# Patient Record
Sex: MALE | Race: WHITE | NOT HISPANIC OR LATINO | Employment: FULL TIME | URBAN - NONMETROPOLITAN AREA
[De-identification: names, ages, dates, MRNs, and addresses within clinical notes are randomized per-mention and may not be internally consistent; named-entity substitution may affect disease eponyms.]

---

## 2023-11-15 ENCOUNTER — HOSPITAL ENCOUNTER (OUTPATIENT)
Facility: HOSPITAL | Age: 58
Setting detail: OBSERVATION
Discharge: HOME | End: 2023-11-20
Attending: FAMILY MEDICINE | Admitting: FAMILY MEDICINE
Payer: COMMERCIAL

## 2023-11-15 ENCOUNTER — APPOINTMENT (OUTPATIENT)
Dept: CT IMAGING | Facility: HOSPITAL | Age: 58
End: 2023-11-15
Payer: COMMERCIAL

## 2023-11-15 ENCOUNTER — NURSE TRIAGE (OUTPATIENT)
Dept: CALL CENTER | Facility: HOSPITAL | Age: 58
End: 2023-11-15

## 2023-11-15 VITALS — WEIGHT: 210 LBS

## 2023-11-15 DIAGNOSIS — G44.1 VASCULAR HEADACHE: ICD-10-CM

## 2023-11-15 DIAGNOSIS — I16.0 HYPERTENSIVE URGENCY, MALIGNANT: Primary | ICD-10-CM

## 2023-11-15 DIAGNOSIS — E87.6 HYPOKALEMIA: ICD-10-CM

## 2023-11-15 LAB
ALBUMIN SERPL-MCNC: 4.3 G/DL (ref 3.5–5.2)
ALBUMIN/GLOB SERPL: 1.5 G/DL
ALP SERPL-CCNC: 69 U/L (ref 39–117)
ALT SERPL W P-5'-P-CCNC: 21 U/L (ref 1–41)
ANION GAP SERPL CALCULATED.3IONS-SCNC: 11 MMOL/L (ref 5–15)
AST SERPL-CCNC: 21 U/L (ref 1–40)
BASOPHILS # BLD AUTO: 0.04 10*3/MM3 (ref 0–0.2)
BASOPHILS NFR BLD AUTO: 0.4 % (ref 0–1.5)
BILIRUB SERPL-MCNC: 0.6 MG/DL (ref 0–1.2)
BUN SERPL-MCNC: 19 MG/DL (ref 8–23)
BUN/CREAT SERPL: 20.7 (ref 7–25)
CALCIUM SPEC-SCNC: 9.8 MG/DL (ref 8.6–10.5)
CHLORIDE SERPL-SCNC: 103 MMOL/L (ref 98–107)
CK SERPL-CCNC: 77 U/L (ref 20–200)
CO2 SERPL-SCNC: 27 MMOL/L (ref 22–29)
CREAT SERPL-MCNC: 0.92 MG/DL (ref 0.76–1.27)
DEPRECATED RDW RBC AUTO: 41.1 FL (ref 37–54)
EGFRCR SERPLBLD CKD-EPI 2021: 95.2 ML/MIN/1.73
EOSINOPHIL # BLD AUTO: 0.11 10*3/MM3 (ref 0–0.4)
EOSINOPHIL NFR BLD AUTO: 1.2 % (ref 0.3–6.2)
ERYTHROCYTE [DISTWIDTH] IN BLOOD BY AUTOMATED COUNT: 11.8 % (ref 12.3–15.4)
GLOBULIN UR ELPH-MCNC: 2.9 GM/DL
GLUCOSE SERPL-MCNC: 102 MG/DL (ref 65–99)
HCT VFR BLD AUTO: 44.7 % (ref 37.5–51)
HGB BLD-MCNC: 14.7 G/DL (ref 13–17.7)
HOLD SPECIMEN: NORMAL
HOLD SPECIMEN: NORMAL
IMM GRANULOCYTES # BLD AUTO: 0.03 10*3/MM3 (ref 0–0.05)
IMM GRANULOCYTES NFR BLD AUTO: 0.3 % (ref 0–0.5)
LIPASE SERPL-CCNC: 51 U/L (ref 13–60)
LYMPHOCYTES # BLD AUTO: 2.04 10*3/MM3 (ref 0.7–3.1)
LYMPHOCYTES NFR BLD AUTO: 21.5 % (ref 19.6–45.3)
MCH RBC QN AUTO: 31.3 PG (ref 26.6–33)
MCHC RBC AUTO-ENTMCNC: 32.9 G/DL (ref 31.5–35.7)
MCV RBC AUTO: 95.1 FL (ref 79–97)
MONOCYTES # BLD AUTO: 0.79 10*3/MM3 (ref 0.1–0.9)
MONOCYTES NFR BLD AUTO: 8.3 % (ref 5–12)
NEUTROPHILS NFR BLD AUTO: 6.5 10*3/MM3 (ref 1.7–7)
NEUTROPHILS NFR BLD AUTO: 68.3 % (ref 42.7–76)
NRBC BLD AUTO-RTO: 0 /100 WBC (ref 0–0.2)
PLATELET # BLD AUTO: 311 10*3/MM3 (ref 140–450)
PMV BLD AUTO: 11.1 FL (ref 6–12)
POTASSIUM SERPL-SCNC: 3.3 MMOL/L (ref 3.5–5.2)
PROT SERPL-MCNC: 7.2 G/DL (ref 6–8.5)
RBC # BLD AUTO: 4.7 10*6/MM3 (ref 4.14–5.8)
SODIUM SERPL-SCNC: 141 MMOL/L (ref 136–145)
TROPONIN T SERPL HS-MCNC: 14 NG/L
WBC NRBC COR # BLD: 9.51 10*3/MM3 (ref 3.4–10.8)
WHOLE BLOOD HOLD COAG: NORMAL
WHOLE BLOOD HOLD SPECIMEN: NORMAL

## 2023-11-15 PROCEDURE — G0378 HOSPITAL OBSERVATION PER HR: HCPCS

## 2023-11-15 PROCEDURE — 96375 TX/PRO/DX INJ NEW DRUG ADDON: CPT

## 2023-11-15 PROCEDURE — 80053 COMPREHEN METABOLIC PANEL: CPT | Performed by: FAMILY MEDICINE

## 2023-11-15 PROCEDURE — 93005 ELECTROCARDIOGRAM TRACING: CPT | Performed by: FAMILY MEDICINE

## 2023-11-15 PROCEDURE — 84484 ASSAY OF TROPONIN QUANT: CPT | Performed by: FAMILY MEDICINE

## 2023-11-15 PROCEDURE — 25510000001 IOPAMIDOL 61 % SOLUTION: Performed by: FAMILY MEDICINE

## 2023-11-15 PROCEDURE — 0 HYDROMORPHONE 1 MG/ML SOLUTION: Performed by: FAMILY MEDICINE

## 2023-11-15 PROCEDURE — 93010 ELECTROCARDIOGRAM REPORT: CPT | Performed by: INTERNAL MEDICINE

## 2023-11-15 PROCEDURE — 93005 ELECTROCARDIOGRAM TRACING: CPT

## 2023-11-15 PROCEDURE — 96365 THER/PROPH/DIAG IV INF INIT: CPT

## 2023-11-15 PROCEDURE — 25010000002 LABETALOL 5 MG/ML SOLUTION: Performed by: FAMILY MEDICINE

## 2023-11-15 PROCEDURE — 25010000002 MORPHINE SULFATE (PF) 2 MG/ML SOLUTION: Performed by: FAMILY MEDICINE

## 2023-11-15 PROCEDURE — 96366 THER/PROPH/DIAG IV INF ADDON: CPT

## 2023-11-15 PROCEDURE — 70450 CT HEAD/BRAIN W/O DYE: CPT

## 2023-11-15 PROCEDURE — 25010000002 HYDRALAZINE PER 20 MG: Performed by: FAMILY MEDICINE

## 2023-11-15 PROCEDURE — 25010000002 ONDANSETRON PER 1 MG: Performed by: FAMILY MEDICINE

## 2023-11-15 PROCEDURE — 82550 ASSAY OF CK (CPK): CPT | Performed by: FAMILY MEDICINE

## 2023-11-15 PROCEDURE — 25010000002 DIPHENHYDRAMINE PER 50 MG: Performed by: FAMILY MEDICINE

## 2023-11-15 PROCEDURE — 25810000003 SODIUM CHLORIDE 0.9 % SOLUTION: Performed by: FAMILY MEDICINE

## 2023-11-15 PROCEDURE — 25010000002 PROCHLORPERAZINE 10 MG/2ML SOLUTION: Performed by: FAMILY MEDICINE

## 2023-11-15 PROCEDURE — 85025 COMPLETE CBC W/AUTO DIFF WBC: CPT | Performed by: FAMILY MEDICINE

## 2023-11-15 PROCEDURE — 36415 COLL VENOUS BLD VENIPUNCTURE: CPT

## 2023-11-15 PROCEDURE — 96376 TX/PRO/DX INJ SAME DRUG ADON: CPT

## 2023-11-15 PROCEDURE — 74177 CT ABD & PELVIS W/CONTRAST: CPT

## 2023-11-15 PROCEDURE — 83690 ASSAY OF LIPASE: CPT | Performed by: FAMILY MEDICINE

## 2023-11-15 PROCEDURE — 99285 EMERGENCY DEPT VISIT HI MDM: CPT

## 2023-11-15 RX ORDER — CHLORHEXIDINE GLUCONATE 500 MG/1
1 CLOTH TOPICAL ONCE
Status: COMPLETED | OUTPATIENT
Start: 2023-11-16 | End: 2023-11-16

## 2023-11-15 RX ORDER — POTASSIUM CITRATE 10 MEQ/1
10 TABLET, EXTENDED RELEASE ORAL
COMMUNITY

## 2023-11-15 RX ORDER — OXYCODONE HYDROCHLORIDE 5 MG/1
5 TABLET ORAL EVERY 4 HOURS PRN
Status: DISCONTINUED | OUTPATIENT
Start: 2023-11-15 | End: 2023-11-20 | Stop reason: HOSPADM

## 2023-11-15 RX ORDER — TIZANIDINE 2 MG/1
2 TABLET ORAL NIGHTLY PRN
COMMUNITY

## 2023-11-15 RX ORDER — BISACODYL 5 MG/1
5 TABLET, DELAYED RELEASE ORAL DAILY PRN
Status: DISCONTINUED | OUTPATIENT
Start: 2023-11-15 | End: 2023-11-20 | Stop reason: HOSPADM

## 2023-11-15 RX ORDER — SODIUM CHLORIDE 0.9 % (FLUSH) 0.9 %
10 SYRINGE (ML) INJECTION AS NEEDED
Status: DISCONTINUED | OUTPATIENT
Start: 2023-11-15 | End: 2023-11-20 | Stop reason: HOSPADM

## 2023-11-15 RX ORDER — PROCHLORPERAZINE EDISYLATE 5 MG/ML
10 INJECTION INTRAMUSCULAR; INTRAVENOUS ONCE
Status: COMPLETED | OUTPATIENT
Start: 2023-11-15 | End: 2023-11-15

## 2023-11-15 RX ORDER — GABAPENTIN 300 MG/1
300 CAPSULE ORAL 3 TIMES DAILY
Status: DISCONTINUED | OUTPATIENT
Start: 2023-11-16 | End: 2023-11-16

## 2023-11-15 RX ORDER — HEPARIN SODIUM 5000 [USP'U]/ML
5000 INJECTION, SOLUTION INTRAVENOUS; SUBCUTANEOUS EVERY 8 HOURS SCHEDULED
Status: DISCONTINUED | OUTPATIENT
Start: 2023-11-15 | End: 2023-11-17

## 2023-11-15 RX ORDER — HYDRALAZINE HYDROCHLORIDE 20 MG/ML
10 INJECTION INTRAMUSCULAR; INTRAVENOUS ONCE
Status: COMPLETED | OUTPATIENT
Start: 2023-11-15 | End: 2023-11-15

## 2023-11-15 RX ORDER — HYDRALAZINE HYDROCHLORIDE 25 MG/1
25 TABLET, FILM COATED ORAL
Qty: 30 TABLET | Refills: 0 | Status: SHIPPED | OUTPATIENT
Start: 2023-11-15 | End: 2023-12-15

## 2023-11-15 RX ORDER — TRAZODONE HYDROCHLORIDE 50 MG/1
50 TABLET ORAL NIGHTLY
Status: DISCONTINUED | OUTPATIENT
Start: 2023-11-16 | End: 2023-11-20 | Stop reason: HOSPADM

## 2023-11-15 RX ORDER — TAMSULOSIN HYDROCHLORIDE 0.4 MG/1
0.4 CAPSULE ORAL DAILY
Status: DISCONTINUED | OUTPATIENT
Start: 2023-11-16 | End: 2023-11-20 | Stop reason: HOSPADM

## 2023-11-15 RX ORDER — TAMSULOSIN HYDROCHLORIDE 0.4 MG/1
1 CAPSULE ORAL DAILY
COMMUNITY

## 2023-11-15 RX ORDER — CLONIDINE HYDROCHLORIDE 0.1 MG/1
0.1 TABLET ORAL DAILY PRN
COMMUNITY
End: 2023-11-20 | Stop reason: HOSPADM

## 2023-11-15 RX ORDER — SODIUM CHLORIDE 0.9 % (FLUSH) 0.9 %
10 SYRINGE (ML) INJECTION EVERY 12 HOURS SCHEDULED
Status: DISCONTINUED | OUTPATIENT
Start: 2023-11-15 | End: 2023-11-20 | Stop reason: HOSPADM

## 2023-11-15 RX ORDER — METOPROLOL TARTRATE 100 MG/1
100 TABLET ORAL 2 TIMES DAILY
Status: DISCONTINUED | OUTPATIENT
Start: 2023-11-16 | End: 2023-11-17

## 2023-11-15 RX ORDER — CLONIDINE HYDROCHLORIDE 0.1 MG/1
0.1 TABLET ORAL 2 TIMES DAILY
Status: DISCONTINUED | OUTPATIENT
Start: 2023-11-16 | End: 2023-11-16

## 2023-11-15 RX ORDER — BUSPIRONE HYDROCHLORIDE 5 MG/1
5 TABLET ORAL 3 TIMES DAILY
Status: DISCONTINUED | OUTPATIENT
Start: 2023-11-16 | End: 2023-11-16

## 2023-11-15 RX ORDER — ONDANSETRON 2 MG/ML
4 INJECTION INTRAMUSCULAR; INTRAVENOUS EVERY 6 HOURS PRN
Status: DISCONTINUED | OUTPATIENT
Start: 2023-11-15 | End: 2023-11-20 | Stop reason: HOSPADM

## 2023-11-15 RX ORDER — ONDANSETRON 2 MG/ML
4 INJECTION INTRAMUSCULAR; INTRAVENOUS ONCE
Status: COMPLETED | OUTPATIENT
Start: 2023-11-15 | End: 2023-11-15

## 2023-11-15 RX ORDER — POLYETHYLENE GLYCOL 3350 17 G/17G
17 POWDER, FOR SOLUTION ORAL DAILY PRN
Status: DISCONTINUED | OUTPATIENT
Start: 2023-11-15 | End: 2023-11-20 | Stop reason: HOSPADM

## 2023-11-15 RX ORDER — HYDRALAZINE HYDROCHLORIDE 20 MG/ML
20 INJECTION INTRAMUSCULAR; INTRAVENOUS ONCE
Status: COMPLETED | OUTPATIENT
Start: 2023-11-15 | End: 2023-11-15

## 2023-11-15 RX ORDER — GABAPENTIN 300 MG/1
300 CAPSULE ORAL 2 TIMES DAILY
COMMUNITY

## 2023-11-15 RX ORDER — SODIUM CHLORIDE 9 MG/ML
40 INJECTION, SOLUTION INTRAVENOUS AS NEEDED
Status: DISCONTINUED | OUTPATIENT
Start: 2023-11-15 | End: 2023-11-20 | Stop reason: HOSPADM

## 2023-11-15 RX ORDER — LABETALOL HYDROCHLORIDE 5 MG/ML
10 INJECTION, SOLUTION INTRAVENOUS ONCE
Status: COMPLETED | OUTPATIENT
Start: 2023-11-15 | End: 2023-11-15

## 2023-11-15 RX ORDER — DIPHENHYDRAMINE HYDROCHLORIDE 50 MG/ML
25 INJECTION INTRAMUSCULAR; INTRAVENOUS ONCE
Status: COMPLETED | OUTPATIENT
Start: 2023-11-15 | End: 2023-11-15

## 2023-11-15 RX ORDER — MORPHINE SULFATE 2 MG/ML
2 INJECTION, SOLUTION INTRAMUSCULAR; INTRAVENOUS ONCE
Status: COMPLETED | OUTPATIENT
Start: 2023-11-15 | End: 2023-11-15

## 2023-11-15 RX ORDER — METOPROLOL TARTRATE 100 MG/1
150 TABLET ORAL ONCE
Status: ON HOLD | COMMUNITY
End: 2023-11-16

## 2023-11-15 RX ORDER — AMOXICILLIN 250 MG
2 CAPSULE ORAL 2 TIMES DAILY
Status: DISCONTINUED | OUTPATIENT
Start: 2023-11-15 | End: 2023-11-20 | Stop reason: HOSPADM

## 2023-11-15 RX ORDER — CHLORHEXIDINE GLUCONATE 500 MG/1
1 CLOTH TOPICAL EVERY 24 HOURS
Status: DISCONTINUED | OUTPATIENT
Start: 2023-11-17 | End: 2023-11-16 | Stop reason: HOSPADM

## 2023-11-15 RX ORDER — BUSPIRONE HYDROCHLORIDE 5 MG/1
10 TABLET ORAL 2 TIMES DAILY PRN
COMMUNITY

## 2023-11-15 RX ORDER — NITROGLYCERIN 0.4 MG/1
0.4 TABLET SUBLINGUAL
Status: DISCONTINUED | OUTPATIENT
Start: 2023-11-15 | End: 2023-11-20 | Stop reason: HOSPADM

## 2023-11-15 RX ORDER — TIZANIDINE 4 MG/1
4 TABLET ORAL NIGHTLY PRN
Status: DISCONTINUED | OUTPATIENT
Start: 2023-11-15 | End: 2023-11-20 | Stop reason: HOSPADM

## 2023-11-15 RX ORDER — ACETAMINOPHEN 325 MG/1
650 TABLET ORAL EVERY 6 HOURS PRN
Status: DISCONTINUED | OUTPATIENT
Start: 2023-11-15 | End: 2023-11-20 | Stop reason: HOSPADM

## 2023-11-15 RX ORDER — FAMOTIDINE 10 MG/ML
20 INJECTION, SOLUTION INTRAVENOUS ONCE
Status: COMPLETED | OUTPATIENT
Start: 2023-11-15 | End: 2023-11-15

## 2023-11-15 RX ORDER — BISACODYL 10 MG
10 SUPPOSITORY, RECTAL RECTAL DAILY PRN
Status: DISCONTINUED | OUTPATIENT
Start: 2023-11-15 | End: 2023-11-20 | Stop reason: HOSPADM

## 2023-11-15 RX ORDER — TRAZODONE HYDROCHLORIDE 50 MG/1
50 TABLET ORAL NIGHTLY
COMMUNITY

## 2023-11-15 RX ADMIN — IOPAMIDOL 100 ML: 612 INJECTION, SOLUTION INTRAVENOUS at 16:20

## 2023-11-15 RX ADMIN — DIPHENHYDRAMINE HYDROCHLORIDE 25 MG: 50 INJECTION, SOLUTION INTRAMUSCULAR; INTRAVENOUS at 16:03

## 2023-11-15 RX ADMIN — MORPHINE SULFATE 2 MG: 2 INJECTION, SOLUTION INTRAMUSCULAR; INTRAVENOUS at 16:03

## 2023-11-15 RX ADMIN — PROCHLORPERAZINE EDISYLATE 10 MG: 5 INJECTION INTRAMUSCULAR; INTRAVENOUS at 16:03

## 2023-11-15 RX ADMIN — SODIUM CHLORIDE 10 MG/HR: 9 INJECTION, SOLUTION INTRAVENOUS at 23:11

## 2023-11-15 RX ADMIN — SODIUM CHLORIDE 5 MG/HR: 9 INJECTION, SOLUTION INTRAVENOUS at 20:32

## 2023-11-15 RX ADMIN — FAMOTIDINE 20 MG: 10 INJECTION INTRAVENOUS at 16:04

## 2023-11-15 RX ADMIN — LABETALOL HYDROCHLORIDE 10 MG: 5 INJECTION INTRAVENOUS at 16:10

## 2023-11-15 RX ADMIN — SODIUM CHLORIDE 10 MG/HR: 9 INJECTION, SOLUTION INTRAVENOUS at 21:06

## 2023-11-15 RX ADMIN — ACETAMINOPHEN 650 MG: 325 TABLET, FILM COATED ORAL at 23:11

## 2023-11-15 RX ADMIN — ONDANSETRON 4 MG: 2 INJECTION INTRAMUSCULAR; INTRAVENOUS at 22:28

## 2023-11-15 RX ADMIN — HYDRALAZINE HYDROCHLORIDE 20 MG: 20 INJECTION INTRAMUSCULAR; INTRAVENOUS at 20:01

## 2023-11-15 RX ADMIN — HYDRALAZINE HYDROCHLORIDE 10 MG: 20 INJECTION INTRAMUSCULAR; INTRAVENOUS at 19:09

## 2023-11-15 RX ADMIN — HYDROMORPHONE HYDROCHLORIDE 1 MG: 1 INJECTION, SOLUTION INTRAMUSCULAR; INTRAVENOUS; SUBCUTANEOUS at 21:03

## 2023-11-15 NOTE — TELEPHONE ENCOUNTER
Pt. Wife calling says he has been having bp issues and has had meds adjusted, but today BP still high, pt. Has had headache several days and sob with chest tightness at times, no blurred vision, she and he states, bp is high now 192/119. Chest tightness intermittent, not at this time, he states,  told her to take him to nearest ER not to drive hour to [place], or she can call EMS, she did verbalize understanding, but not sure if will follow directions. Wants to come to  [place]. Told her can be transferred if goes to other hospital, if needs transfer.

## 2023-11-15 NOTE — Clinical Note
Level of Care: Telemetry [5]   Diagnosis: Hypertensive urgency [291457]   Admitting Physician: ROCCO COLLINS [[id number]]   Attending Physician: ROCCO COLLINS [[id number]]

## 2023-11-15 NOTE — ED PROVIDER NOTES
HPI:  Patient is a 58-year-old white male who presents to the emergency room with a complaint of having elevated blood pressure has been present for the past few weeks.  Patient also with a headache that is developed over the past 2 to 3 days.  Blood pressures have been systolically greater than 176.  Today upon arrival to the emergency room is over 200.  Patient takes 150 mg of metoprolol extended release daily and clonidine as needed for blood pressure.  He states that the clonidine does not seem to really help and is bringing the blood pressure on down.  Patient has not ran out of any medication and has not missed any doses of medications.  Patient also states during this time the last 2 weeks he has been having intermittent abdominal pain in the mid to the right lower quadrant of the abdomen.  He states that the pressure and the pain in the abdomen seems to be worse when the pressure is a lot higher for blood pressure.  No nausea vomiting or diarrhea no chest pain or diaphoresis.  No visual changes.  Patient currently rates the headache 4 out of 10 and abdominal pain 6 out of 10    REVIEW OF SYSTEMS  CONSTITUTIONAL:  No complaints of fever, chills,or weakness  EYES:  No complaints of discharge   ENT: No complaints of sore throat or ear pain  CARDIOVASCULAR: Positive for hypertension RESPIRATORY:  No complaints of cough or shortness of breath  GI: Positive for mid abdominal to right lower quadrant abdominal pain sharp and stabbing in nature   MUSCULOSKELETAL:  No complaints of back pain  SKIN:  No complaints of rash  NEUROLOGIC: Positive for headache 4 out of 10 throbbing and pounding in nature  ENDOCRINE:  No complaints of polyuria or polydipsia  LYMPHATIC:  No complaints of swollen glands  GENITOURINARY: No complaints of urinary frequency or hematuria        PAST MEDICAL HISTORY  Past Medical History:   Diagnosis Date    Hypertension     Prostate disease     Sleep apnea        FAMILY HISTORY  History reviewed.  "No pertinent family history.    SOCIAL HISTORY  Social History     Socioeconomic History    Marital status:        IMMUNIZATION HISTORY  Deferred to primary care physician.    SURGICAL HISTORY  Past Surgical History:   Procedure Laterality Date    BACK SURGERY      CHOLECYSTECTOMY         CURRENT MEDICATIONS  No current facility-administered medications for this encounter.    Current Outpatient Medications:     busPIRone (BUSPAR) 5 MG tablet, Take 1 tablet by mouth 3 (Three) Times a Day., Disp: , Rfl:     cloNIDine (CATAPRES) 0.1 MG tablet, Take 1 tablet by mouth 2 (Two) Times a Day., Disp: , Rfl:     gabapentin (NEURONTIN) 300 MG capsule, Take 1 capsule by mouth 3 (Three) Times a Day., Disp: , Rfl:     hydrALAZINE (APRESOLINE) 25 MG tablet, Take 1 tablet by mouth Daily With Breakfast for 30 days., Disp: 30 tablet, Rfl: 0    metoprolol tartrate (LOPRESSOR) 100 MG tablet, Take 1.5 tablets by mouth 2 (Two) Times a Day., Disp: , Rfl:     potassium citrate (UROCIT-K) 10 MEQ (1080 MG) CR tablet, Take 1 tablet by mouth 3 (Three) Times a Day With Meals., Disp: , Rfl:     tamsulosin (FLOMAX) 0.4 MG capsule 24 hr capsule, Take 1 capsule by mouth Daily., Disp: , Rfl:     tiZANidine (ZANAFLEX) 4 MG tablet, Take 1 tablet by mouth At Night As Needed for Muscle Spasms., Disp: , Rfl:     traZODone (DESYREL) 50 MG tablet, Take 1 tablet by mouth Every Night., Disp: , Rfl:     ALLERGIES  Allergies   Allergen Reactions    Codeine Nausea And Vomiting       Neuro exam    VITAL SIGNS:   /99   Pulse 73   Temp 97.5 °F (36.4 °C)   Resp 20   Ht 182.9 cm (72\")   Wt 105 kg (230 lb 8 oz)   SpO2 91%   BMI 31.26 kg/m²     Constitutional: Patient is alert and in no distress.  Patient with moderate head moderate to severe abdominal discomfort.    ENT: There is a normal pharynx with no acute erythema or exudate and oral mucosa is moist.  Nose is clear with no drainage.  Tympanic membranes intact and " non-erythemic    Cardiovascular: S1-S2 regular rate and rhythm no murmurs rubs or gallops is noted.    Respiratory: Patient is clear to auscultation bilaterally with no wheezing or rhonchi.  Chest wall is nontender.  There is no external lesions on the chest.  There is no crepitance    Abdomen: Tenderness to palpation in periumbilical and right lower quadrant of the abdomen.  There is no abdominal distention or fluid wave.  Bowel sounds are normal in all 4 quadrants.    Integumentary: No acute changes noted    Genitourinary: Patient is voiding appropriately.    Integument: No acute lesions noted color appears to be normal.    Neurological: Patient with normal gait.CNS 2-12 grossly intact there is no focal deficits strength is 5+ in bilateral upper and lower extremities.    Jennifer Coma Scale: 15          RADIOLOGY/PROCEDURES      CT Head Without Contrast   Final Result   Impression:         No acute intracranial abnormality.       This report was signed and finalized on 11/15/2023 4:32 PM CST by Erik Perera.          CT Abdomen Pelvis With Contrast   Final Result   1. Small bilateral pleural effusions with at least passive atelectasis   in both lower lobes. Cardiomegaly.   2. Scattered low-density liver lesions are probably cysts but are not   fully characterized on this single contrast study. The largest is in the   right hepatic lobe measuring 2.2 cm.   3. Splenomegaly.   4. Enlarged prostate.   5. Gastric wall thickening likely due to underdistention. Normal   appendix. No small bowel obstruction is seen. Under distention of most   of the colon.   6. Small fat-containing left inguinal and umbilical hernias. Other   nonacute findings, as discussed.           The full report of this exam was immediately signed and available to the   emergency room. The patient is currently in the emergency room.       This report was signed and finalized on 11/15/2023 4:35 PM CST by Dr. Pancho Hobson MD.                   FUTURE APPOINTMENTS     No future appointments.               COURSE & MEDICAL DECISION MAKING     Patient's partial differential diagnosis can include: Hypertensive urgency, hypertensive headache, atypical migraine, brain tumor and other     Discussed with the patient and family member the results of all laboratory radiological test.  All questions were answered    Due to patient's resistant hypertension despite using labetalol as well as hydralazine IV for blood pressure control patient's blood pressure was not controlled.  Due to this a Cardene drip was started for blood pressure control.  Due to this also the patient will need to be admitted to the hospital.  Discussed this with the patient and the patient felt comfortable with this plan.    Discussed the case with hospitalist Dr. Mai who agrees to admit to her medical service.      Patient's level of risk: Moderate        CRITICAL CARE    CRITICAL CARE: No CRITICAL CARE TIME: None      The patient's last clinical visit to PCP in the [facility] electronic old medical record was reviewed by me:     Also Old charts were reviewed per [facility] EMR.  Pertinent details are summarized above.  All laboratory, radiologic, and EKG studies that were performed in the Emergency Department were a necessary part of the evaluation needed to exclude unstable or  emergent medical conditions:     Patient was hemodynamically and neurologically stable in the ED.   Pertinent studies were reviewed as above.     Recent Results (from the past 24 hour(s))   ECG 12 Lead Chest Pain    Collection Time: 11/15/23  2:58 PM   Result Value Ref Range    QT Interval 392 ms    QTC Interval 449 ms   Comprehensive Metabolic Panel    Collection Time: 11/15/23  3:02 PM    Specimen: Blood   Result Value Ref Range    Glucose 102 (H) 65 - 99 mg/dL    BUN 19 8 - 23 mg/dL    Creatinine 0.92 0.76 - 1.27 mg/dL    Sodium 141 136 - 145 mmol/L    Potassium 3.3 (L) 3.5 - 5.2 mmol/L    Chloride 103 98 - 107 mmol/L     CO2 27.0 22.0 - 29.0 mmol/L    Calcium 9.8 8.6 - 10.5 mg/dL    Total Protein 7.2 6.0 - 8.5 g/dL    Albumin 4.3 3.5 - 5.2 g/dL    ALT (SGPT) 21 1 - 41 U/L    AST (SGOT) 21 1 - 40 U/L    Alkaline Phosphatase 69 39 - 117 U/L    Total Bilirubin 0.6 0.0 - 1.2 mg/dL    Globulin 2.9 gm/dL    A/G Ratio 1.5 g/dL    BUN/Creatinine Ratio 20.7 7.0 - 25.0    Anion Gap 11.0 5.0 - 15.0 mmol/L    eGFR 95.2 >60.0 mL/min/1.73   Single High Sensitivity Troponin T    Collection Time: 11/15/23  3:02 PM    Specimen: Blood   Result Value Ref Range    HS Troponin T 14 <22 ng/L   Green Top (Gel)    Collection Time: 11/15/23  3:02 PM   Result Value Ref Range    Extra Tube Hold for add-ons.    Lavender Top    Collection Time: 11/15/23  3:02 PM   Result Value Ref Range    Extra Tube hold for add-on    Red Top    Collection Time: 11/15/23  3:02 PM   Result Value Ref Range    Extra Tube Hold for add-ons.    Light Blue Top    Collection Time: 11/15/23  3:02 PM   Result Value Ref Range    Extra Tube Hold for add-ons.    CBC Auto Differential    Collection Time: 11/15/23  3:02 PM    Specimen: Blood   Result Value Ref Range    WBC 9.51 3.40 - 10.80 10*3/mm3    RBC 4.70 4.14 - 5.80 10*6/mm3    Hemoglobin 14.7 13.0 - 17.7 g/dL    Hematocrit 44.7 37.5 - 51.0 %    MCV 95.1 79.0 - 97.0 fL    MCH 31.3 26.6 - 33.0 pg    MCHC 32.9 31.5 - 35.7 g/dL    RDW 11.8 (L) 12.3 - 15.4 %    RDW-SD 41.1 37.0 - 54.0 fl    MPV 11.1 6.0 - 12.0 fL    Platelets 311 140 - 450 10*3/mm3    Neutrophil % 68.3 42.7 - 76.0 %    Lymphocyte % 21.5 19.6 - 45.3 %    Monocyte % 8.3 5.0 - 12.0 %    Eosinophil % 1.2 0.3 - 6.2 %    Basophil % 0.4 0.0 - 1.5 %    Immature Grans % 0.3 0.0 - 0.5 %    Neutrophils, Absolute 6.50 1.70 - 7.00 10*3/mm3    Lymphocytes, Absolute 2.04 0.70 - 3.10 10*3/mm3    Monocytes, Absolute 0.79 0.10 - 0.90 10*3/mm3    Eosinophils, Absolute 0.11 0.00 - 0.40 10*3/mm3    Basophils, Absolute 0.04 0.00 - 0.20 10*3/mm3    Immature Grans, Absolute 0.03 0.00 - 0.05  10*3/mm3    nRBC 0.0 0.0 - 0.2 /100 WBC   CK    Collection Time: 11/15/23  3:02 PM    Specimen: Blood   Result Value Ref Range    Creatine Kinase 77 20 - 200 U/L   Lipase    Collection Time: 11/15/23  3:02 PM    Specimen: Blood   Result Value Ref Range    Lipase 51 13 - 60 U/L         The patient received:  Medications   labetalol (NORMODYNE,TRANDATE) injection 10 mg (10 mg Intravenous Given 11/15/23 1610)   famotidine (PEPCID) injection 20 mg (20 mg Intravenous Given 11/15/23 1604)   prochlorperazine (COMPAZINE) injection 10 mg (10 mg Intravenous Given 11/15/23 1603)   diphenhydrAMINE (BENADRYL) injection 25 mg (25 mg Intravenous Given 11/15/23 1603)   Morphine sulfate (PF) injection 2 mg (2 mg Intravenous Given 11/15/23 1603)   iopamidol (ISOVUE-300) 61 % injection 100 mL (100 mL Intravenous Given 11/15/23 1620)   hydrALAZINE (APRESOLINE) injection 10 mg (10 mg Intravenous Given 11/15/23 1909)              ED Disposition       ED Disposition   Discharge    Condition   Stable    Comment   --                   Dragon disclaimer:  Part of this note may be an electronic transcription/translation of spoken language to printed text using the Dragon Dictation System.     This information is consistent with my knowledge of the patient’s controlled substance use history.    Patient evaluate during Coronavirus Pandemic. Isolation practices followed according to [facility] policy.    FINAL IMPRESSION   Diagnosis Plan   1. Hypertensive urgency, malignant        2. Vascular headache        3. Hypokalemia              MD He Grubbs Jr, Thomas Mark Jr., MD  11/16/23 9721

## 2023-11-15 NOTE — TELEPHONE ENCOUNTER
"Reason for Disposition   [1] Systolic BP  >= 160 OR Diastolic >= 100 AND [2] cardiac (e.g., breathing difficulty, chest pain) or neurologic symptoms (e.g., new-onset blurred or double vision, unsteady gait)    Additional Information   Negative: Difficult to awaken or acting confused (e.g., disoriented, slurred speech)   Negative: SEVERE difficulty breathing (e.g., struggling for each breath, speaks in single words)   Negative: [1] Weakness of the face, arm or leg on one side of the body AND [2] new-onset   Negative: [1] Numbness (i.e., loss of sensation) of the face, arm or leg on one side of the body AND [2] new-onset   Negative: [1] Chest pain lasts > 5 minutes AND [2] history of heart disease (i.e., heart attack, bypass surgery, angina, angioplasty, CHF)   Negative: [1] Chest pain AND [2] took nitrogylcerin AND [3] pain was not relieved   Negative: Sounds like a life-threatening emergency to the triager   Negative: Symptom is main concern (e.g., headache, chest pain)   Negative: Low blood pressure is main concern    Answer Assessment - Initial Assessment Questions  1. BLOOD PRESSURE: \"What is the blood pressure?\" \"Did you take at least two measurements 5 minutes apart?\"      199/122, HR 68, 192/119, HR 64  2. ONSET: \"When did you take your blood pressure?\"      Just now  3. HOW: \"How did you take your blood pressure?\" (e.g., automatic home BP monitor, visiting nurse)      Automatic cuff  4. HISTORY: \"Do you have a history of high blood pressure?\"      yes  5. MEDICINES: \"Are you taking any medicines for blood pressure?\" \"Have you missed any doses recently?\"      Yes takes not missed  6. OTHER SYMPTOMS: \"Do you have any symptoms?\" (e.g., blurred vision, chest pain, difficulty breathing, headache, weakness)     Headache,sob at times, tightness in chest  7. PREGNANCY: \"Is there any chance you are pregnant?\" \"When was your last menstrual period?\"      no    Protocols used: Blood Pressure - High-ADULT-AH    "

## 2023-11-16 ENCOUNTER — APPOINTMENT (OUTPATIENT)
Dept: CARDIOLOGY | Facility: HOSPITAL | Age: 58
End: 2023-11-16
Payer: COMMERCIAL

## 2023-11-16 LAB
ANION GAP SERPL CALCULATED.3IONS-SCNC: 10 MMOL/L (ref 5–15)
BH CV ECHO MEAS - AO MAX PG: 8.5 MMHG
BH CV ECHO MEAS - AO MEAN PG: 4.5 MMHG
BH CV ECHO MEAS - AO ROOT DIAM: 3.3 CM
BH CV ECHO MEAS - AO V2 MAX: 146 CM/SEC
BH CV ECHO MEAS - AO V2 VTI: 29.3 CM
BH CV ECHO MEAS - AVA(I,D): 1.8 CM2
BH CV ECHO MEAS - EDV(CUBED): 132.7 ML
BH CV ECHO MEAS - EDV(MOD-SP4): 117 ML
BH CV ECHO MEAS - EF(MOD-SP4): 64.1 %
BH CV ECHO MEAS - ESV(CUBED): 81.7 ML
BH CV ECHO MEAS - ESV(MOD-SP4): 42 ML
BH CV ECHO MEAS - FS: 14.9 %
BH CV ECHO MEAS - IVS/LVPW: 1.13 CM
BH CV ECHO MEAS - IVSD: 1.13 CM
BH CV ECHO MEAS - LA DIMENSION: 4.8 CM
BH CV ECHO MEAS - LAT PEAK E' VEL: 6.8 CM/SEC
BH CV ECHO MEAS - LV DIASTOLIC VOL/BSA (35-75): 51.9 CM2
BH CV ECHO MEAS - LV MASS(C)D: 204.7 GRAMS
BH CV ECHO MEAS - LV MAX PG: 3.3 MMHG
BH CV ECHO MEAS - LV MEAN PG: 2 MMHG
BH CV ECHO MEAS - LV SYSTOLIC VOL/BSA (12-30): 18.6 CM2
BH CV ECHO MEAS - LV V1 MAX: 90.8 CM/SEC
BH CV ECHO MEAS - LV V1 VTI: 18.6 CM
BH CV ECHO MEAS - LVIDD: 5.1 CM
BH CV ECHO MEAS - LVIDS: 4.3 CM
BH CV ECHO MEAS - LVOT AREA: 2.8 CM2
BH CV ECHO MEAS - LVOT DIAM: 1.9 CM
BH CV ECHO MEAS - LVPWD: 1 CM
BH CV ECHO MEAS - MED PEAK E' VEL: 6.6 CM/SEC
BH CV ECHO MEAS - MR MAX PG: 48.8 MMHG
BH CV ECHO MEAS - MR MAX VEL: 349 CM/SEC
BH CV ECHO MEAS - MV A MAX VEL: 39.4 CM/SEC
BH CV ECHO MEAS - MV DEC TIME: 0.2 SEC
BH CV ECHO MEAS - MV E MAX VEL: 95.1 CM/SEC
BH CV ECHO MEAS - MV E/A: 2.41
BH CV ECHO MEAS - SI(MOD-SP4): 33.3 ML/M2
BH CV ECHO MEAS - SV(LVOT): 52.7 ML
BH CV ECHO MEAS - SV(MOD-SP4): 75 ML
BH CV ECHO MEAS - TR MAX PG: 9.2 MMHG
BH CV ECHO MEAS - TR MAX VEL: 152 CM/SEC
BH CV ECHO MEASUREMENTS AVERAGE E/E' RATIO: 14.19
BUN SERPL-MCNC: 16 MG/DL (ref 8–23)
BUN/CREAT SERPL: 17.2 (ref 7–25)
CALCIUM SPEC-SCNC: 9.2 MG/DL (ref 8.6–10.5)
CHLORIDE SERPL-SCNC: 106 MMOL/L (ref 98–107)
CO2 SERPL-SCNC: 27 MMOL/L (ref 22–29)
CREAT SERPL-MCNC: 0.93 MG/DL (ref 0.76–1.27)
EGFRCR SERPLBLD CKD-EPI 2021: 94 ML/MIN/1.73
GLUCOSE SERPL-MCNC: 145 MG/DL (ref 65–99)
LEFT ATRIUM VOLUME INDEX: 51.1 ML/M2
LEFT ATRIUM VOLUME: 115 ML
POTASSIUM SERPL-SCNC: 3.1 MMOL/L (ref 3.5–5.2)
POTASSIUM SERPL-SCNC: 4.1 MMOL/L (ref 3.5–5.2)
SODIUM SERPL-SCNC: 143 MMOL/L (ref 136–145)

## 2023-11-16 PROCEDURE — 82088 ASSAY OF ALDOSTERONE: CPT | Performed by: HOSPITALIST

## 2023-11-16 PROCEDURE — G0378 HOSPITAL OBSERVATION PER HR: HCPCS

## 2023-11-16 PROCEDURE — 25810000003 SODIUM CHLORIDE 0.9 % SOLUTION: Performed by: FAMILY MEDICINE

## 2023-11-16 PROCEDURE — 25010000002 HEPARIN (PORCINE) PER 1000 UNITS: Performed by: HOSPITALIST

## 2023-11-16 PROCEDURE — 96372 THER/PROPH/DIAG INJ SC/IM: CPT

## 2023-11-16 PROCEDURE — 25010000002 PROMETHAZINE PER 50 MG: Performed by: INTERNAL MEDICINE

## 2023-11-16 PROCEDURE — 80048 BASIC METABOLIC PNL TOTAL CA: CPT | Performed by: HOSPITALIST

## 2023-11-16 PROCEDURE — 93306 TTE W/DOPPLER COMPLETE: CPT | Performed by: INTERNAL MEDICINE

## 2023-11-16 PROCEDURE — 84244 ASSAY OF RENIN: CPT | Performed by: HOSPITALIST

## 2023-11-16 PROCEDURE — 93306 TTE W/DOPPLER COMPLETE: CPT

## 2023-11-16 PROCEDURE — 36415 COLL VENOUS BLD VENIPUNCTURE: CPT | Performed by: HOSPITALIST

## 2023-11-16 PROCEDURE — 25010000002 ONDANSETRON PER 1 MG: Performed by: HOSPITALIST

## 2023-11-16 PROCEDURE — 96375 TX/PRO/DX INJ NEW DRUG ADDON: CPT

## 2023-11-16 PROCEDURE — 96376 TX/PRO/DX INJ SAME DRUG ADON: CPT

## 2023-11-16 PROCEDURE — 84132 ASSAY OF SERUM POTASSIUM: CPT | Performed by: INTERNAL MEDICINE

## 2023-11-16 PROCEDURE — 25510000001 PERFLUTREN 6.52 MG/ML SUSPENSION: Performed by: INTERNAL MEDICINE

## 2023-11-16 RX ORDER — MORPHINE SULFATE 2 MG/ML
1 INJECTION, SOLUTION INTRAMUSCULAR; INTRAVENOUS EVERY 4 HOURS PRN
Status: DISCONTINUED | OUTPATIENT
Start: 2023-11-16 | End: 2023-11-18

## 2023-11-16 RX ORDER — GABAPENTIN 300 MG/1
300 CAPSULE ORAL 3 TIMES DAILY
Status: DISCONTINUED | OUTPATIENT
Start: 2023-11-16 | End: 2023-11-20 | Stop reason: HOSPADM

## 2023-11-16 RX ORDER — NICOTINE 21 MG/24HR
1 PATCH, TRANSDERMAL 24 HOURS TRANSDERMAL
Status: DISCONTINUED | OUTPATIENT
Start: 2023-11-16 | End: 2023-11-20 | Stop reason: HOSPADM

## 2023-11-16 RX ORDER — POTASSIUM CHLORIDE 750 MG/1
40 CAPSULE, EXTENDED RELEASE ORAL EVERY 4 HOURS
Status: COMPLETED | OUTPATIENT
Start: 2023-11-16 | End: 2023-11-16

## 2023-11-16 RX ORDER — VALSARTAN 80 MG/1
320 TABLET ORAL
Status: DISCONTINUED | OUTPATIENT
Start: 2023-11-16 | End: 2023-11-20 | Stop reason: HOSPADM

## 2023-11-16 RX ORDER — METOPROLOL SUCCINATE 100 MG/1
100 TABLET, EXTENDED RELEASE ORAL DAILY
COMMUNITY
End: 2023-11-20 | Stop reason: HOSPADM

## 2023-11-16 RX ORDER — BUSPIRONE HYDROCHLORIDE 5 MG/1
5 TABLET ORAL 3 TIMES DAILY
Status: DISCONTINUED | OUTPATIENT
Start: 2023-11-16 | End: 2023-11-16

## 2023-11-16 RX ORDER — METOPROLOL SUCCINATE 50 MG/1
50 TABLET, EXTENDED RELEASE ORAL NIGHTLY
COMMUNITY
End: 2023-11-20 | Stop reason: HOSPADM

## 2023-11-16 RX ORDER — LORAZEPAM 0.5 MG/1
0.5 TABLET ORAL EVERY 6 HOURS PRN
Status: DISCONTINUED | OUTPATIENT
Start: 2023-11-16 | End: 2023-11-20 | Stop reason: HOSPADM

## 2023-11-16 RX ORDER — AMLODIPINE AND VALSARTAN 10; 320 MG/1; MG/1
1 TABLET ORAL DAILY
COMMUNITY
End: 2023-11-20 | Stop reason: HOSPADM

## 2023-11-16 RX ORDER — AMLODIPINE BESYLATE 10 MG/1
10 TABLET ORAL
Status: DISCONTINUED | OUTPATIENT
Start: 2023-11-16 | End: 2023-11-17

## 2023-11-16 RX ORDER — HYDRALAZINE HYDROCHLORIDE 20 MG/ML
10 INJECTION INTRAMUSCULAR; INTRAVENOUS EVERY 4 HOURS PRN
Status: DISCONTINUED | OUTPATIENT
Start: 2023-11-16 | End: 2023-11-17

## 2023-11-16 RX ORDER — POTASSIUM CHLORIDE 750 MG/1
40 CAPSULE, EXTENDED RELEASE ORAL ONCE
Status: COMPLETED | OUTPATIENT
Start: 2023-11-16 | End: 2023-11-16

## 2023-11-16 RX ORDER — TESTOSTERONE CYPIONATE 200 MG/ML
1 VIAL (ML) INTRAMUSCULAR WEEKLY
COMMUNITY
End: 2023-11-20 | Stop reason: HOSPADM

## 2023-11-16 RX ADMIN — NICOTINE 1 PATCH: 14 PATCH, EXTENDED RELEASE TRANSDERMAL at 11:55

## 2023-11-16 RX ADMIN — PROMETHAZINE HYDROCHLORIDE 12.5 MG: 25 INJECTION INTRAMUSCULAR; INTRAVENOUS at 10:36

## 2023-11-16 RX ADMIN — OXYCODONE HYDROCHLORIDE 5 MG: 5 TABLET ORAL at 07:47

## 2023-11-16 RX ADMIN — SODIUM CHLORIDE 5 MG/HR: 9 INJECTION, SOLUTION INTRAVENOUS at 02:44

## 2023-11-16 RX ADMIN — Medication 10 ML: at 02:27

## 2023-11-16 RX ADMIN — DOCUSATE SODIUM 50 MG AND SENNOSIDES 8.6 MG 2 TABLET: 8.6; 5 TABLET, FILM COATED ORAL at 12:45

## 2023-11-16 RX ADMIN — TRAZODONE HYDROCHLORIDE 50 MG: 50 TABLET ORAL at 20:18

## 2023-11-16 RX ADMIN — OXYCODONE HYDROCHLORIDE 5 MG: 5 TABLET ORAL at 13:39

## 2023-11-16 RX ADMIN — GABAPENTIN 300 MG: 300 CAPSULE ORAL at 02:24

## 2023-11-16 RX ADMIN — DOCUSATE SODIUM 50 MG AND SENNOSIDES 8.6 MG 2 TABLET: 8.6; 5 TABLET, FILM COATED ORAL at 02:21

## 2023-11-16 RX ADMIN — HEPARIN SODIUM 5000 UNITS: 5000 INJECTION INTRAVENOUS; SUBCUTANEOUS at 02:25

## 2023-11-16 RX ADMIN — POTASSIUM CHLORIDE 40 MEQ: 10 CAPSULE, COATED, EXTENDED RELEASE ORAL at 02:20

## 2023-11-16 RX ADMIN — VALSARTAN 320 MG: 80 TABLET, FILM COATED ORAL at 20:18

## 2023-11-16 RX ADMIN — METOPROLOL TARTRATE 100 MG: 100 TABLET, FILM COATED ORAL at 02:21

## 2023-11-16 RX ADMIN — METOPROLOL TARTRATE 100 MG: 100 TABLET, FILM COATED ORAL at 12:42

## 2023-11-16 RX ADMIN — GABAPENTIN 300 MG: 300 CAPSULE ORAL at 12:44

## 2023-11-16 RX ADMIN — POTASSIUM CHLORIDE 40 MEQ: 10 CAPSULE, COATED, EXTENDED RELEASE ORAL at 06:45

## 2023-11-16 RX ADMIN — HEPARIN SODIUM 5000 UNITS: 5000 INJECTION INTRAVENOUS; SUBCUTANEOUS at 22:06

## 2023-11-16 RX ADMIN — PERFLUTREN 1.17 MG: 6.52 INJECTION, SUSPENSION INTRAVENOUS at 10:32

## 2023-11-16 RX ADMIN — ONDANSETRON 4 MG: 2 INJECTION INTRAMUSCULAR; INTRAVENOUS at 07:47

## 2023-11-16 RX ADMIN — Medication 10 ML: at 12:45

## 2023-11-16 RX ADMIN — ACETAMINOPHEN 650 MG: 325 TABLET, FILM COATED ORAL at 20:25

## 2023-11-16 RX ADMIN — Medication 1 APPLICATION: at 16:20

## 2023-11-16 RX ADMIN — TRAZODONE HYDROCHLORIDE 50 MG: 50 TABLET ORAL at 02:24

## 2023-11-16 RX ADMIN — METOPROLOL TARTRATE 100 MG: 100 TABLET, FILM COATED ORAL at 20:18

## 2023-11-16 RX ADMIN — TAMSULOSIN HYDROCHLORIDE 0.4 MG: 0.4 CAPSULE ORAL at 12:45

## 2023-11-16 RX ADMIN — Medication 1 APPLICATION: at 02:25

## 2023-11-16 RX ADMIN — CHLORHEXIDINE GLUCONATE 1 APPLICATION: 500 CLOTH TOPICAL at 00:00

## 2023-11-16 RX ADMIN — POTASSIUM CHLORIDE 40 MEQ: 10 CAPSULE, COATED, EXTENDED RELEASE ORAL at 20:21

## 2023-11-16 RX ADMIN — BUSPIRONE HYDROCHLORIDE 5 MG: 5 TABLET ORAL at 02:25

## 2023-11-16 RX ADMIN — GABAPENTIN 300 MG: 300 CAPSULE ORAL at 20:19

## 2023-11-16 RX ADMIN — BUSPIRONE HYDROCHLORIDE 5 MG: 5 TABLET ORAL at 12:45

## 2023-11-16 RX ADMIN — ACETAMINOPHEN 650 MG: 325 TABLET, FILM COATED ORAL at 12:40

## 2023-11-16 RX ADMIN — HEPARIN SODIUM 5000 UNITS: 5000 INJECTION INTRAVENOUS; SUBCUTANEOUS at 16:19

## 2023-11-16 RX ADMIN — AMLODIPINE BESYLATE 10 MG: 10 TABLET ORAL at 20:18

## 2023-11-16 RX ADMIN — Medication 10 ML: at 20:19

## 2023-11-16 RX ADMIN — POTASSIUM CHLORIDE 40 MEQ: 10 CAPSULE, COATED, EXTENDED RELEASE ORAL at 12:45

## 2023-11-16 RX ADMIN — OXYCODONE HYDROCHLORIDE 5 MG: 5 TABLET ORAL at 02:55

## 2023-11-16 NOTE — ED NOTES
Nursing report ED to floor  Deangelo Posadas  58 y.o.  male    HPI:   Chief Complaint   Patient presents with    Hypertension    Headache       Admitting doctor:   Adalgisa Mai MD    Consulting provider(s):  Consults       No orders found from 10/17/2023 to 11/16/2023.             Admitting diagnosis:   The primary encounter diagnosis was Hypertensive urgency, malignant. Diagnoses of Vascular headache and Hypokalemia were also pertinent to this visit.    Code status:   Current Code Status       Date Active Code Status Order ID Comments User Context       Not on file            Allergies:   Codeine    Intake and Output    Intake/Output Summary (Last 24 hours) at 11/15/2023 2153  Last data filed at 11/15/2023 2105  Gross per 24 hour   Intake 27.5 ml   Output --   Net 27.5 ml       Weight:       11/15/23  1454   Weight: 105 kg (230 lb 8 oz)       Most recent vitals:   Vitals:    11/15/23 1938 11/15/23 2018 11/15/23 2048 11/15/23 2101   BP: (!) 184/103 (!) 192/99 180/98 171/91   Pulse: 74 89 98 96   Resp:       Temp:       SpO2: 95% 98% 98% 95%   Weight:       Height:         Oxygen Therapy: .    Active LDAs/IV Access:   Lines, Drains & Airways       Active LDAs       Name Placement date Placement time Site Days    Peripheral IV 11/15/23 Anterior;Distal;Left;Upper Arm 11/15/23  --  Arm  less than 1                    Labs (abnormal labs have a star):   Labs Reviewed   COMPREHENSIVE METABOLIC PANEL - Abnormal; Notable for the following components:       Result Value    Glucose 102 (*)     Potassium 3.3 (*)     All other components within normal limits    Narrative:     GFR Normal >60  Chronic Kidney Disease <60  Kidney Failure <15     CBC WITH AUTO DIFFERENTIAL - Abnormal; Notable for the following components:    RDW 11.8 (*)     All other components within normal limits   SINGLE HSTROPONIN T - Normal    Narrative:     High Sensitive Troponin T Reference Range:  <14.0 ng/L- Negative Female for AMI  <22.0 ng/L- Negative  Male for AMI  >=14 - Abnormal Female indicating possible myocardial injury.  >=22 - Abnormal Male indicating possible myocardial injury.   Clinicians would have to utilize clinical acumen, EKG, Troponin, and serial changes to determine if it is an Acute Myocardial Infarction or myocardial injury due to an underlying chronic condition.        CK - Normal   LIPASE - Normal   RAINBOW DRAW    Narrative:     The following orders were created for panel order Van Draw.  Procedure                               Abnormality         Status                     ---------                               -----------         ------                     Green Top (Gel)[[id number]]                                  Final result               Lavender Top[[id number]]                                     Final result               Red Top[[id number]]                                          Final result               Light Blue Top[[id number]]                                   Final result                 Please view results for these tests on the individual orders.   CBC AND DIFFERENTIAL    Narrative:     The following orders were created for panel order CBC & Differential.  Procedure                               Abnormality         Status                     ---------                               -----------         ------                     CBC Auto Differential[[id number]]        Abnormal            Final result                 Please view results for these tests on the individual orders.   GREEN TOP   LAVENDER TOP   RED TOP   LIGHT BLUE TOP       Meds given in ED:   Medications   niCARdipine (CARDENE) 25 mg in 250 mL NS infusion kit (10 mg/hr Intravenous New Bag 11/15/23 2106)   labetalol (NORMODYNE,TRANDATE) injection 10 mg (10 mg Intravenous Given 11/15/23 1610)   famotidine (PEPCID) injection 20 mg (20 mg Intravenous Given 11/15/23 1604)   prochlorperazine (COMPAZINE) injection 10 mg (10 mg Intravenous Given 11/15/23 1603)    diphenhydrAMINE (BENADRYL) injection 25 mg (25 mg Intravenous Given 11/15/23 1603)   Morphine sulfate (PF) injection 2 mg (2 mg Intravenous Given 11/15/23 1603)   iopamidol (ISOVUE-300) 61 % injection 100 mL (100 mL Intravenous Given 11/15/23 1620)   hydrALAZINE (APRESOLINE) injection 10 mg (10 mg Intravenous Given 11/15/23 1909)   hydrALAZINE (APRESOLINE) injection 20 mg (20 mg Intravenous Given 11/15/23 2001)   HYDROmorphone (DILAUDID) injection 1 mg (1 mg Intravenous Given 11/15/23 2103)     niCARdipine, 10-15 mg/hr, Last Rate: 10 mg/hr (11/15/23 2106)         NIH Stroke Scale:       Isolation/Infection(s):  No active isolations   No active infections     COVID Testing  Collected .  Resulted .    Nursing report ED to floor:  Mental status: .  Ambulatory status: .  Precautions: .    ED nurse phone extentsion- ..

## 2023-11-16 NOTE — PLAN OF CARE
Goal Outcome Evaluation:   Pt administered ordered home meds and cardene d/c'd. Home med list amended per pt verification. Pressure has remained within normal limits without further intervention. VSS WNL throughout shift.  Pt able to rest between care. 0033 resulted labs indicate need for potassium replacement protocol. See MAR. Pt still c/o h/a, interventions noted see MAR. Pt reminded to follow up with PCP about HTN issues provoking this admission.

## 2023-11-17 ENCOUNTER — APPOINTMENT (OUTPATIENT)
Dept: MRI IMAGING | Facility: HOSPITAL | Age: 58
End: 2023-11-17
Payer: COMMERCIAL

## 2023-11-17 LAB
ALBUMIN SERPL-MCNC: 4.1 G/DL (ref 3.5–5.2)
ALBUMIN/GLOB SERPL: 1.4 G/DL
ALP SERPL-CCNC: 66 U/L (ref 39–117)
ALT SERPL W P-5'-P-CCNC: 18 U/L (ref 1–41)
ANION GAP SERPL CALCULATED.3IONS-SCNC: 8 MMOL/L (ref 5–15)
AST SERPL-CCNC: 18 U/L (ref 1–40)
BASOPHILS # BLD AUTO: 0.04 10*3/MM3 (ref 0–0.2)
BASOPHILS NFR BLD AUTO: 0.5 % (ref 0–1.5)
BILIRUB SERPL-MCNC: 0.6 MG/DL (ref 0–1.2)
BUN SERPL-MCNC: 17 MG/DL (ref 8–23)
BUN/CREAT SERPL: 18.5 (ref 7–25)
CALCIUM SPEC-SCNC: 9.1 MG/DL (ref 8.6–10.5)
CHLORIDE SERPL-SCNC: 106 MMOL/L (ref 98–107)
CO2 SERPL-SCNC: 26 MMOL/L (ref 22–29)
CREAT SERPL-MCNC: 0.92 MG/DL (ref 0.76–1.27)
DEPRECATED RDW RBC AUTO: 43.5 FL (ref 37–54)
EGFRCR SERPLBLD CKD-EPI 2021: 95.2 ML/MIN/1.73
EOSINOPHIL # BLD AUTO: 0.14 10*3/MM3 (ref 0–0.4)
EOSINOPHIL NFR BLD AUTO: 1.9 % (ref 0.3–6.2)
ERYTHROCYTE [DISTWIDTH] IN BLOOD BY AUTOMATED COUNT: 12.2 % (ref 12.3–15.4)
GLOBULIN UR ELPH-MCNC: 2.9 GM/DL
GLUCOSE SERPL-MCNC: 122 MG/DL (ref 65–99)
HCT VFR BLD AUTO: 45.4 % (ref 37.5–51)
HGB BLD-MCNC: 14.6 G/DL (ref 13–17.7)
IMM GRANULOCYTES # BLD AUTO: 0.03 10*3/MM3 (ref 0–0.05)
IMM GRANULOCYTES NFR BLD AUTO: 0.4 % (ref 0–0.5)
LYMPHOCYTES # BLD AUTO: 1.7 10*3/MM3 (ref 0.7–3.1)
LYMPHOCYTES NFR BLD AUTO: 22.5 % (ref 19.6–45.3)
MAGNESIUM SERPL-MCNC: 2.2 MG/DL (ref 1.6–2.4)
MCH RBC QN AUTO: 31.3 PG (ref 26.6–33)
MCHC RBC AUTO-ENTMCNC: 32.2 G/DL (ref 31.5–35.7)
MCV RBC AUTO: 97.2 FL (ref 79–97)
MONOCYTES # BLD AUTO: 0.63 10*3/MM3 (ref 0.1–0.9)
MONOCYTES NFR BLD AUTO: 8.4 % (ref 5–12)
NEUTROPHILS NFR BLD AUTO: 5 10*3/MM3 (ref 1.7–7)
NEUTROPHILS NFR BLD AUTO: 66.3 % (ref 42.7–76)
NRBC BLD AUTO-RTO: 0 /100 WBC (ref 0–0.2)
PLATELET # BLD AUTO: 285 10*3/MM3 (ref 140–450)
PMV BLD AUTO: 10.5 FL (ref 6–12)
POTASSIUM SERPL-SCNC: 3.7 MMOL/L (ref 3.5–5.2)
PROT SERPL-MCNC: 7 G/DL (ref 6–8.5)
QT INTERVAL: 392 MS
QTC INTERVAL: 449 MS
RBC # BLD AUTO: 4.67 10*6/MM3 (ref 4.14–5.8)
SODIUM SERPL-SCNC: 140 MMOL/L (ref 136–145)
TSH SERPL DL<=0.05 MIU/L-ACNC: 0.84 UIU/ML (ref 0.27–4.2)
WBC NRBC COR # BLD AUTO: 7.54 10*3/MM3 (ref 3.4–10.8)

## 2023-11-17 PROCEDURE — 25010000002 HEPARIN (PORCINE) PER 1000 UNITS: Performed by: HOSPITALIST

## 2023-11-17 PROCEDURE — 25010000002 MORPHINE SULFATE (PF) 2 MG/ML SOLUTION: Performed by: INTERNAL MEDICINE

## 2023-11-17 PROCEDURE — 96372 THER/PROPH/DIAG INJ SC/IM: CPT

## 2023-11-17 PROCEDURE — 25010000002 ENOXAPARIN PER 10 MG: Performed by: INTERNAL MEDICINE

## 2023-11-17 PROCEDURE — 83735 ASSAY OF MAGNESIUM: CPT | Performed by: INTERNAL MEDICINE

## 2023-11-17 PROCEDURE — 80053 COMPREHEN METABOLIC PANEL: CPT | Performed by: INTERNAL MEDICINE

## 2023-11-17 PROCEDURE — 96376 TX/PRO/DX INJ SAME DRUG ADON: CPT

## 2023-11-17 PROCEDURE — 85025 COMPLETE CBC W/AUTO DIFF WBC: CPT | Performed by: INTERNAL MEDICINE

## 2023-11-17 PROCEDURE — 25010000002 FUROSEMIDE PER 20 MG: Performed by: INTERNAL MEDICINE

## 2023-11-17 PROCEDURE — 84443 ASSAY THYROID STIM HORMONE: CPT | Performed by: INTERNAL MEDICINE

## 2023-11-17 PROCEDURE — 25010000002 HYDRALAZINE PER 20 MG: Performed by: HOSPITALIST

## 2023-11-17 PROCEDURE — G0378 HOSPITAL OBSERVATION PER HR: HCPCS

## 2023-11-17 PROCEDURE — 96375 TX/PRO/DX INJ NEW DRUG ADDON: CPT

## 2023-11-17 PROCEDURE — 83835 ASSAY OF METANEPHRINES: CPT | Performed by: INTERNAL MEDICINE

## 2023-11-17 PROCEDURE — 70551 MRI BRAIN STEM W/O DYE: CPT

## 2023-11-17 RX ORDER — OXYCODONE HYDROCHLORIDE 5 MG/1
10 TABLET ORAL EVERY 4 HOURS PRN
Status: CANCELLED | OUTPATIENT
Start: 2023-11-17 | End: 2023-11-24

## 2023-11-17 RX ORDER — NIFEDIPINE 30 MG/1
30 TABLET, EXTENDED RELEASE ORAL DAILY PRN
Status: DISCONTINUED | OUTPATIENT
Start: 2023-11-17 | End: 2023-11-20 | Stop reason: HOSPADM

## 2023-11-17 RX ORDER — HYDRALAZINE HYDROCHLORIDE 20 MG/ML
10 INJECTION INTRAMUSCULAR; INTRAVENOUS EVERY 6 HOURS PRN
Status: DISCONTINUED | OUTPATIENT
Start: 2023-11-17 | End: 2023-11-17

## 2023-11-17 RX ORDER — OXYMETAZOLINE HYDROCHLORIDE 0.05 G/100ML
2 SPRAY NASAL 2 TIMES DAILY
Status: COMPLETED | OUTPATIENT
Start: 2023-11-17 | End: 2023-11-17

## 2023-11-17 RX ORDER — FUROSEMIDE 10 MG/ML
20 INJECTION INTRAMUSCULAR; INTRAVENOUS ONCE
Status: COMPLETED | OUTPATIENT
Start: 2023-11-17 | End: 2023-11-17

## 2023-11-17 RX ORDER — HYDROCHLOROTHIAZIDE 25 MG/1
50 TABLET ORAL ONCE
Status: COMPLETED | OUTPATIENT
Start: 2023-11-17 | End: 2023-11-17

## 2023-11-17 RX ORDER — ENOXAPARIN SODIUM 100 MG/ML
40 INJECTION SUBCUTANEOUS NIGHTLY
Status: DISCONTINUED | OUTPATIENT
Start: 2023-11-17 | End: 2023-11-18

## 2023-11-17 RX ADMIN — OXYCODONE HYDROCHLORIDE 5 MG: 5 TABLET ORAL at 09:33

## 2023-11-17 RX ADMIN — FUROSEMIDE 20 MG: 10 INJECTION, SOLUTION INTRAMUSCULAR; INTRAVENOUS at 10:32

## 2023-11-17 RX ADMIN — Medication 2 SPRAY: at 11:01

## 2023-11-17 RX ADMIN — HEPARIN SODIUM 5000 UNITS: 5000 INJECTION INTRAVENOUS; SUBCUTANEOUS at 05:30

## 2023-11-17 RX ADMIN — Medication 10 ML: at 20:49

## 2023-11-17 RX ADMIN — HEPARIN SODIUM 5000 UNITS: 5000 INJECTION INTRAVENOUS; SUBCUTANEOUS at 15:11

## 2023-11-17 RX ADMIN — GABAPENTIN 300 MG: 300 CAPSULE ORAL at 08:06

## 2023-11-17 RX ADMIN — DOCUSATE SODIUM 50 MG AND SENNOSIDES 8.6 MG 2 TABLET: 8.6; 5 TABLET, FILM COATED ORAL at 20:48

## 2023-11-17 RX ADMIN — Medication 2 SPRAY: at 20:49

## 2023-11-17 RX ADMIN — ENOXAPARIN SODIUM 40 MG: 100 INJECTION SUBCUTANEOUS at 20:49

## 2023-11-17 RX ADMIN — ACETAMINOPHEN 650 MG: 325 TABLET, FILM COATED ORAL at 02:01

## 2023-11-17 RX ADMIN — NICOTINE 1 PATCH: 14 PATCH, EXTENDED RELEASE TRANSDERMAL at 08:11

## 2023-11-17 RX ADMIN — HYDROCHLOROTHIAZIDE 50 MG: 25 TABLET ORAL at 17:42

## 2023-11-17 RX ADMIN — METOPROLOL TARTRATE 100 MG: 100 TABLET, FILM COATED ORAL at 08:11

## 2023-11-17 RX ADMIN — Medication 10 ML: at 08:06

## 2023-11-17 RX ADMIN — OXYCODONE HYDROCHLORIDE 5 MG: 5 TABLET ORAL at 13:44

## 2023-11-17 RX ADMIN — MORPHINE SULFATE 1 MG: 2 INJECTION, SOLUTION INTRAMUSCULAR; INTRAVENOUS at 08:06

## 2023-11-17 RX ADMIN — GABAPENTIN 300 MG: 300 CAPSULE ORAL at 20:49

## 2023-11-17 RX ADMIN — GABAPENTIN 300 MG: 300 CAPSULE ORAL at 15:39

## 2023-11-17 RX ADMIN — AMLODIPINE BESYLATE 10 MG: 10 TABLET ORAL at 08:12

## 2023-11-17 RX ADMIN — TAMSULOSIN HYDROCHLORIDE 0.4 MG: 0.4 CAPSULE ORAL at 08:10

## 2023-11-17 RX ADMIN — TRAZODONE HYDROCHLORIDE 50 MG: 50 TABLET ORAL at 20:48

## 2023-11-17 RX ADMIN — VALSARTAN 320 MG: 80 TABLET, FILM COATED ORAL at 08:11

## 2023-11-17 RX ADMIN — LORAZEPAM 0.5 MG: 0.5 TABLET ORAL at 05:30

## 2023-11-17 RX ADMIN — HYDRALAZINE HYDROCHLORIDE 10 MG: 20 INJECTION INTRAMUSCULAR; INTRAVENOUS at 02:22

## 2023-11-17 NOTE — PROGRESS NOTES
"    [facility] Medicine Services  INPATIENT PROGRESS NOTE    Patient Name: Deangelo Posadas  Date of Admission: 11/15/2023  Today's Date: 11/17/23  Length of Stay: 1  Primary Care Physician: Christiane Mancera APRN    Subjective     Patient complains of headache the majority of last night.        Objective    Temp:  [97.6 °F (36.4 °C)-98.8 °F (37.1 °C)] 98.8 °F (37.1 °C)  Heart Rate:  [66-84] 71  Resp:  [18-20] 18  BP: (142-173)/() 162/85    General: No acute distress  HEENT: normocephalic, atraumatic  Neck: Supple  CV: RRR  Lung: Clear to auscultation bilaterally.  No wheeze, rales, or rhonchi noted.  Abdominal exam: Positive bowel sounds, nontender, non distended  Extremity: No edema noted  Neurological exam: AAO x3. Cranial nerve II to XII grossly intact  Skin exam: Dry and warm  Psychiatric exam: Calm, cooperative, and normal affect       Results Review:  I have reviewed the labs, radiology results, and diagnostic studies.    Laboratory Data:   Results from last 7 days   Lab Units 11/17/23  0525 11/15/23  1502   WBC 10*3/mm3 7.54 9.51   HEMOGLOBIN g/dL 14.6 14.7   HEMATOCRIT % 45.4 44.7   PLATELETS 10*3/mm3 285 311        Results from last 7 days   Lab Units 11/17/23  0525 11/16/23  1728 11/16/23  0033 11/15/23  1502   SODIUM mmol/L 140  --  143 141   POTASSIUM mmol/L 3.7 4.1 3.1* 3.3*   CHLORIDE mmol/L 106  --  106 103   CO2 mmol/L 26.0  --  27.0 27.0   BUN mg/dL 17  --  16 19   CREATININE mg/dL 0.92  --  0.93 0.92   CALCIUM mg/dL 9.1  --  9.2 9.8   BILIRUBIN mg/dL 0.6  --   --  0.6   ALK PHOS U/L 66  --   --  69   ALT (SGPT) U/L 18  --   --  21   AST (SGOT) U/L 18  --   --  21   GLUCOSE mg/dL 122*  --  145* 102*       Culture Data:   No results found for: \"BLOODCX\", \"URINECX\", \"WOUNDCX\", \"MRSACX\", \"RESPCX\", \"STOOLCX\"    Radiology Data:   Imaging Results (Last 24 Hours)       ** No results found for the last 24 hours. **            I have reviewed the patient's current " medications.     Assessment/Plan   Assessment  Active Hospital Problems    Diagnosis     **Hypertensive urgency        Assessment and Plan:  58-year-old male with a past medical history of hypertension admitted for hypertensive emergency.    HTN, likely secondary HTN of undetermined etiology: Pt reports episodic headaches and flushing with facial erythema that are associated with his elevated blood pressure. I am unable to hear renal bruit on exam.  -cont home valsartan  -discontinue home amlodipine  -discontinue home metoprolol which would have very little effect on BP and is best used as a rate control medication, it could also exacerbate secondary HTN  -lasix 20 IV x1 this am  -HCTZ 50 po x1 this pm  -discontinue hydralazine 10 IV prn SBP > 180 which makes titrating his home meds extremely difficult  -Nifedipine 30 XL prn SBP > 170, DBP > 100  -will likely switch amlodipine to nifedipine tomorrow am    Headaches possibly 2/2 HTN vs other etiology:  -MRI brain pending    Hypokalemia, resolved:  -replace K    BPH:  -cont home flomax    Anxiety:  -hold home buspar which can affect the results of secondary htn labs  -ativan prn      Dispo: Pt continues to have headaches and elevated BP despite adding a third hypertensive medication.  I suspect he has some form of undiagnosed secondary hypertension.  Renal ultrasound ordered to check for renal artery stenosis as a cause of his high blood pressure.  MRI of the brain ordered for his recurrent headaches.  I have discontinued as needed IV hydralazine which makes titrating his home blood pressure medications extremely difficult and have added as needed nifedipine p.o.    Filemon Miller MD 11/17/23, 17:35 CST.                  Treatment Plan      Medical Decision Making  Number and Complexity of problems:   Differential Diagnosis:     Conditions and Status             MDM Data  External documents reviewed:   Cardiac tracing (EKG, telemetry) interpretation:   Radiology  interpretation:   Labs reviewed:   Any tests that were considered but not ordered:      Decision rules/scores evaluated (example [id number], Wells, etc):      Discussed with:      Care Planning  Shared decision making:   Code status and discussions:     Disposition  Social Determinants of Health that impact treatment or disposition:   I expect the patient to be discharged to  in  days.     Electronically signed by Filemon Miller MD, 11/17/23, 17:35 CST.

## 2023-11-17 NOTE — PLAN OF CARE
Goal Outcome Evaluation:              Outcome Evaluation: ALoX4. SB on tele. BP high. complained of H/A. prn tylenol given. no c/o pain

## 2023-11-17 NOTE — PLAN OF CARE
Goal Outcome Evaluation:  Plan of Care Reviewed With: patient, spouse        Progress: no change  Outcome Evaluation: Patient has had a headache for most of the day and SBP>160. We are attempting to treat this with diuretics. Will also check renal U/S for structural abnormalities. Otherwise he is doing well.

## 2023-11-17 NOTE — PROGRESS NOTES
"    [facility] Medicine Services  INPATIENT PROGRESS NOTE    Patient Name: Deangelo Posadas  Date of Admission: 11/15/2023  Today's Date: 11/16/23  Length of Stay: 1  Primary Care Physician: Christiane Mancera APRN    Subjective     Patient vomiting on exam this morning.        Objective    Temp:  [97.3 °F (36.3 °C)-98.3 °F (36.8 °C)] 98.3 °F (36.8 °C)  Heart Rate:  [] 67  Resp:  [10-20] 20  BP: (110-192)/() 163/87    General: No acute distress  HEENT: normocephalic, atraumatic  Neck: Supple  CV: RRR  Lung: Clear to auscultation bilaterally.  No wheeze, rales, or rhonchi noted.  Abdominal exam: Positive bowel sounds, nontender, non distended  Extremity: No edema noted  Neurological exam: AAO x3. Cranial nerve II to XII grossly intact  Skin exam: Dry and warm  Psychiatric exam: Calm, cooperative, and normal affect       Results Review:  I have reviewed the labs, radiology results, and diagnostic studies.    Laboratory Data:   Results from last 7 days   Lab Units 11/15/23  1502   WBC 10*3/mm3 9.51   HEMOGLOBIN g/dL 14.7   HEMATOCRIT % 44.7   PLATELETS 10*3/mm3 311        Results from last 7 days   Lab Units 11/16/23  1728 11/16/23  0033 11/15/23  1502   SODIUM mmol/L  --  143 141   POTASSIUM mmol/L 4.1 3.1* 3.3*   CHLORIDE mmol/L  --  106 103   CO2 mmol/L  --  27.0 27.0   BUN mg/dL  --  16 19   CREATININE mg/dL  --  0.93 0.92   CALCIUM mg/dL  --  9.2 9.8   BILIRUBIN mg/dL  --   --  0.6   ALK PHOS U/L  --   --  69   ALT (SGPT) U/L  --   --  21   AST (SGOT) U/L  --   --  21   GLUCOSE mg/dL  --  145* 102*       Culture Data:   No results found for: \"BLOODCX\", \"URINECX\", \"WOUNDCX\", \"MRSACX\", \"RESPCX\", \"STOOLCX\"    Radiology Data:   Imaging Results (Last 24 Hours)       ** No results found for the last 24 hours. **            I have reviewed the patient's current medications.     Assessment/Plan   Assessment  Active Hospital Problems    Diagnosis     **Hypertensive urgency  "       Assessment and Plan:  58-year-old male with a past medical history of hypertension admitted for hypertensive emergency.    HTN: possible secondary HTN  -cont home ACE, CCB  -hydralazine 10 IV prn SBP > 180    Hypokalemia:  -replace K    BPH:  -cont home flomax    Anxiety:  -hold home buspar which can affect the results of secondary htn labs  -ativan prn      Dispo: Wife is at the bedside demanding cardiology consult.  I explained to her that he does not have any criteria for cardiology inpatient consult.  EKG is within normal limits, echo showed mild LVH likely secondary to longstanding hypertension.  Transfer to telemetry    Filemon Miller MD 11/16/23, 19:09 CST.                  Treatment Plan      Medical Decision Making  Number and Complexity of problems:   Differential Diagnosis:     Conditions and Status             MDM Data  External documents reviewed:   Cardiac tracing (EKG, telemetry) interpretation:   Radiology interpretation:   Labs reviewed:   Any tests that were considered but not ordered:      Decision rules/scores evaluated (example [id number], Wells, etc):      Discussed with:      Care Planning  Shared decision making:   Code status and discussions:     Disposition  Social Determinants of Health that impact treatment or disposition:   I expect the patient to be discharged to  in  days.     Electronically signed by Filemon Miller MD, 11/16/23, 19:09 CST.

## 2023-11-18 ENCOUNTER — APPOINTMENT (OUTPATIENT)
Dept: CT IMAGING | Facility: HOSPITAL | Age: 58
End: 2023-11-18
Payer: COMMERCIAL

## 2023-11-18 LAB
ALBUMIN SERPL-MCNC: 4.2 G/DL (ref 3.5–5.2)
ALBUMIN/GLOB SERPL: 1.3 G/DL
ALP SERPL-CCNC: 69 U/L (ref 39–117)
ALT SERPL W P-5'-P-CCNC: 20 U/L (ref 1–41)
ANION GAP SERPL CALCULATED.3IONS-SCNC: 12 MMOL/L (ref 5–15)
AST SERPL-CCNC: 21 U/L (ref 1–40)
BASOPHILS # BLD AUTO: 0.02 10*3/MM3 (ref 0–0.2)
BASOPHILS NFR BLD AUTO: 0.3 % (ref 0–1.5)
BILIRUB SERPL-MCNC: 0.7 MG/DL (ref 0–1.2)
BUN SERPL-MCNC: 16 MG/DL (ref 8–23)
BUN/CREAT SERPL: 16.8 (ref 7–25)
CALCIUM SPEC-SCNC: 9.1 MG/DL (ref 8.6–10.5)
CHLORIDE SERPL-SCNC: 99 MMOL/L (ref 98–107)
CO2 SERPL-SCNC: 27 MMOL/L (ref 22–29)
CREAT SERPL-MCNC: 0.95 MG/DL (ref 0.76–1.27)
DEPRECATED RDW RBC AUTO: 42.5 FL (ref 37–54)
EGFRCR SERPLBLD CKD-EPI 2021: 91.6 ML/MIN/1.73
EOSINOPHIL # BLD AUTO: 0.2 10*3/MM3 (ref 0–0.4)
EOSINOPHIL NFR BLD AUTO: 2.7 % (ref 0.3–6.2)
ERYTHROCYTE [DISTWIDTH] IN BLOOD BY AUTOMATED COUNT: 12.1 % (ref 12.3–15.4)
GLOBULIN UR ELPH-MCNC: 3.3 GM/DL
GLUCOSE SERPL-MCNC: 108 MG/DL (ref 65–99)
HCT VFR BLD AUTO: 47.5 % (ref 37.5–51)
HGB BLD-MCNC: 15.7 G/DL (ref 13–17.7)
IMM GRANULOCYTES # BLD AUTO: 0.02 10*3/MM3 (ref 0–0.05)
IMM GRANULOCYTES NFR BLD AUTO: 0.3 % (ref 0–0.5)
INR PPP: 1.14 (ref 0.91–1.09)
LYMPHOCYTES # BLD AUTO: 1.68 10*3/MM3 (ref 0.7–3.1)
LYMPHOCYTES NFR BLD AUTO: 22.4 % (ref 19.6–45.3)
MCH RBC QN AUTO: 31.3 PG (ref 26.6–33)
MCHC RBC AUTO-ENTMCNC: 33.1 G/DL (ref 31.5–35.7)
MCV RBC AUTO: 94.8 FL (ref 79–97)
MONOCYTES # BLD AUTO: 0.59 10*3/MM3 (ref 0.1–0.9)
MONOCYTES NFR BLD AUTO: 7.9 % (ref 5–12)
NEUTROPHILS NFR BLD AUTO: 4.98 10*3/MM3 (ref 1.7–7)
NEUTROPHILS NFR BLD AUTO: 66.4 % (ref 42.7–76)
NRBC BLD AUTO-RTO: 0 /100 WBC (ref 0–0.2)
PLATELET # BLD AUTO: 298 10*3/MM3 (ref 140–450)
PMV BLD AUTO: 10.4 FL (ref 6–12)
POTASSIUM SERPL-SCNC: 2.8 MMOL/L (ref 3.5–5.2)
POTASSIUM SERPL-SCNC: 3.1 MMOL/L (ref 3.5–5.2)
PROT SERPL-MCNC: 7.5 G/DL (ref 6–8.5)
PROTHROMBIN TIME: 14.7 SECONDS (ref 11.8–14.8)
RBC # BLD AUTO: 5.01 10*6/MM3 (ref 4.14–5.8)
SODIUM SERPL-SCNC: 138 MMOL/L (ref 136–145)
WBC NRBC COR # BLD AUTO: 7.49 10*3/MM3 (ref 3.4–10.8)

## 2023-11-18 PROCEDURE — 74175 CTA ABDOMEN W/CONTRAST: CPT

## 2023-11-18 PROCEDURE — 96372 THER/PROPH/DIAG INJ SC/IM: CPT

## 2023-11-18 PROCEDURE — 85610 PROTHROMBIN TIME: CPT | Performed by: INTERNAL MEDICINE

## 2023-11-18 PROCEDURE — G0378 HOSPITAL OBSERVATION PER HR: HCPCS

## 2023-11-18 PROCEDURE — 96367 TX/PROPH/DG ADDL SEQ IV INF: CPT

## 2023-11-18 PROCEDURE — 96376 TX/PRO/DX INJ SAME DRUG ADON: CPT

## 2023-11-18 PROCEDURE — 85025 COMPLETE CBC W/AUTO DIFF WBC: CPT | Performed by: INTERNAL MEDICINE

## 2023-11-18 PROCEDURE — 25010000002 ENOXAPARIN PER 10 MG: Performed by: INTERNAL MEDICINE

## 2023-11-18 PROCEDURE — 96366 THER/PROPH/DIAG IV INF ADDON: CPT

## 2023-11-18 PROCEDURE — 25510000001 IOPAMIDOL PER 1 ML: Performed by: INTERNAL MEDICINE

## 2023-11-18 PROCEDURE — 84132 ASSAY OF SERUM POTASSIUM: CPT | Performed by: INTERNAL MEDICINE

## 2023-11-18 PROCEDURE — 93010 ELECTROCARDIOGRAM REPORT: CPT | Performed by: INTERNAL MEDICINE

## 2023-11-18 PROCEDURE — 80053 COMPREHEN METABOLIC PANEL: CPT | Performed by: INTERNAL MEDICINE

## 2023-11-18 PROCEDURE — 93005 ELECTROCARDIOGRAM TRACING: CPT | Performed by: INTERNAL MEDICINE

## 2023-11-18 RX ORDER — POTASSIUM CHLORIDE 750 MG/1
40 CAPSULE, EXTENDED RELEASE ORAL EVERY 4 HOURS
Status: COMPLETED | OUTPATIENT
Start: 2023-11-18 | End: 2023-11-18

## 2023-11-18 RX ORDER — HYDROCHLOROTHIAZIDE 25 MG/1
50 TABLET ORAL DAILY
Status: DISCONTINUED | OUTPATIENT
Start: 2023-11-18 | End: 2023-11-20

## 2023-11-18 RX ORDER — DILTIAZEM HYDROCHLORIDE 5 MG/ML
0.25 INJECTION INTRAVENOUS ONCE
Status: COMPLETED | OUTPATIENT
Start: 2023-11-18 | End: 2023-11-18

## 2023-11-18 RX ORDER — DILTIAZEM HCL/D5W 125 MG/125
5-15 PLASTIC BAG, INJECTION (ML) INTRAVENOUS
Status: DISCONTINUED | OUTPATIENT
Start: 2023-11-18 | End: 2023-11-20

## 2023-11-18 RX ORDER — AMLODIPINE BESYLATE 10 MG/1
10 TABLET ORAL
Status: DISCONTINUED | OUTPATIENT
Start: 2023-11-18 | End: 2023-11-20 | Stop reason: HOSPADM

## 2023-11-18 RX ORDER — ENOXAPARIN SODIUM 100 MG/ML
1 INJECTION SUBCUTANEOUS EVERY 12 HOURS
Status: DISCONTINUED | OUTPATIENT
Start: 2023-11-18 | End: 2023-11-19

## 2023-11-18 RX ORDER — POTASSIUM CHLORIDE 750 MG/1
60 CAPSULE, EXTENDED RELEASE ORAL 2 TIMES DAILY WITH MEALS
Status: COMPLETED | OUTPATIENT
Start: 2023-11-18 | End: 2023-11-19

## 2023-11-18 RX ORDER — HYDROCHLOROTHIAZIDE 25 MG/1
50 TABLET ORAL DAILY PRN
Status: DISCONTINUED | OUTPATIENT
Start: 2023-11-18 | End: 2023-11-20

## 2023-11-18 RX ORDER — DILTIAZEM HYDROCHLORIDE 5 MG/ML
35 INJECTION INTRAVENOUS ONCE
Status: COMPLETED | OUTPATIENT
Start: 2023-11-18 | End: 2023-11-18

## 2023-11-18 RX ADMIN — DILTIAZEM HYDROCHLORIDE 25.75 MG: 5 INJECTION INTRAVENOUS at 18:29

## 2023-11-18 RX ADMIN — DILTIAZEM HYDROCHLORIDE 35 MG: 5 INJECTION INTRAVENOUS at 18:47

## 2023-11-18 RX ADMIN — IOPAMIDOL 100 ML: 755 INJECTION, SOLUTION INTRAVENOUS at 14:26

## 2023-11-18 RX ADMIN — HYDROCHLOROTHIAZIDE 50 MG: 25 TABLET ORAL at 11:04

## 2023-11-18 RX ADMIN — ENOXAPARIN SODIUM 100 MG: 100 INJECTION SUBCUTANEOUS at 20:15

## 2023-11-18 RX ADMIN — AMLODIPINE BESYLATE 10 MG: 10 TABLET ORAL at 11:04

## 2023-11-18 RX ADMIN — ACETAMINOPHEN 650 MG: 325 TABLET, FILM COATED ORAL at 08:20

## 2023-11-18 RX ADMIN — GABAPENTIN 300 MG: 300 CAPSULE ORAL at 20:15

## 2023-11-18 RX ADMIN — GABAPENTIN 300 MG: 300 CAPSULE ORAL at 17:25

## 2023-11-18 RX ADMIN — POTASSIUM CHLORIDE 40 MEQ: 10 CAPSULE, COATED, EXTENDED RELEASE ORAL at 08:08

## 2023-11-18 RX ADMIN — LORAZEPAM 0.5 MG: 0.5 TABLET ORAL at 01:41

## 2023-11-18 RX ADMIN — Medication 5 MG/HR: at 18:36

## 2023-11-18 RX ADMIN — Medication 10 ML: at 20:16

## 2023-11-18 RX ADMIN — POTASSIUM CHLORIDE 40 MEQ: 10 CAPSULE, COATED, EXTENDED RELEASE ORAL at 12:12

## 2023-11-18 RX ADMIN — Medication 10 ML: at 08:08

## 2023-11-18 RX ADMIN — DOCUSATE SODIUM 50 MG AND SENNOSIDES 8.6 MG 2 TABLET: 8.6; 5 TABLET, FILM COATED ORAL at 20:15

## 2023-11-18 RX ADMIN — POTASSIUM CHLORIDE 40 MEQ: 10 CAPSULE, COATED, EXTENDED RELEASE ORAL at 17:25

## 2023-11-18 RX ADMIN — TRAZODONE HYDROCHLORIDE 50 MG: 50 TABLET ORAL at 20:15

## 2023-11-18 RX ADMIN — OXYCODONE HYDROCHLORIDE 5 MG: 5 TABLET ORAL at 01:41

## 2023-11-18 RX ADMIN — GABAPENTIN 300 MG: 300 CAPSULE ORAL at 08:07

## 2023-11-18 RX ADMIN — VALSARTAN 320 MG: 80 TABLET, FILM COATED ORAL at 08:07

## 2023-11-18 RX ADMIN — TAMSULOSIN HYDROCHLORIDE 0.4 MG: 0.4 CAPSULE ORAL at 08:09

## 2023-11-18 RX ADMIN — NICOTINE 1 PATCH: 14 PATCH, EXTENDED RELEASE TRANSDERMAL at 08:09

## 2023-11-18 RX ADMIN — DOCUSATE SODIUM 50 MG AND SENNOSIDES 8.6 MG 2 TABLET: 8.6; 5 TABLET, FILM COATED ORAL at 08:11

## 2023-11-18 RX ADMIN — POTASSIUM CHLORIDE 60 MEQ: 10 CAPSULE, COATED, EXTENDED RELEASE ORAL at 22:17

## 2023-11-18 RX ADMIN — LORAZEPAM 0.5 MG: 0.5 TABLET ORAL at 22:17

## 2023-11-18 RX ADMIN — ACETAMINOPHEN 650 MG: 325 TABLET, FILM COATED ORAL at 14:35

## 2023-11-18 NOTE — PLAN OF CARE
Goal Outcome Evaluation:      Patient a/o x4 with v/s/s. Treated for pain x1 and anxiety x2 this shift--see MAR. Patient able to get some sleep w/ home cpap on. Spouse remained at bedside overnight. Sinus arrhythmic to Sinus rhythm HR 65-84 PVCs, Svt run up to 13 seconds. Will update oncoming dayshift RN as appropriate at change of shift report at the bedside in the a.m.

## 2023-11-18 NOTE — PROGRESS NOTES
"    [facility] Medicine Services  INPATIENT PROGRESS NOTE    Patient Name: Deangelo Posadas  Date of Admission: 11/15/2023  Today's Date: 11/18/23  Length of Stay: 1  Primary Care Physician: Christiane Mancera APRN    Subjective     Patient reports improvement in headache.        Objective    Temp:  [97.6 °F (36.4 °C)-98.8 °F (37.1 °C)] 98.5 °F (36.9 °C)  Heart Rate:  [] 93  Resp:  [16-18] 16  BP: (145-168)/(85-99) 145/88    General: No acute distress  HEENT: normocephalic, atraumatic  Neck: Supple  CV: RRR  Lung: Clear to auscultation bilaterally.  No wheeze, rales, or rhonchi noted.  Abdominal exam: Positive bowel sounds, nontender, non distended  Extremity: No edema noted  Neurological exam: AAO x3. Cranial nerve II to XII grossly intact  Skin exam: Dry and warm  Psychiatric exam: Calm, cooperative, and normal affect       Results Review:  I have reviewed the labs, radiology results, and diagnostic studies.    Laboratory Data:   Results from last 7 days   Lab Units 11/18/23  0505 11/17/23  0525 11/15/23  1502   WBC 10*3/mm3 7.49 7.54 9.51   HEMOGLOBIN g/dL 15.7 14.6 14.7   HEMATOCRIT % 47.5 45.4 44.7   PLATELETS 10*3/mm3 298 285 311        Results from last 7 days   Lab Units 11/18/23  0505 11/17/23  0525 11/16/23  1728 11/16/23  0033 11/15/23  1502   SODIUM mmol/L 138 140  --  143 141   POTASSIUM mmol/L 2.8* 3.7 4.1 3.1* 3.3*   CHLORIDE mmol/L 99 106  --  106 103   CO2 mmol/L 27.0 26.0  --  27.0 27.0   BUN mg/dL 16 17  --  16 19   CREATININE mg/dL 0.95 0.92  --  0.93 0.92   CALCIUM mg/dL 9.1 9.1  --  9.2 9.8   BILIRUBIN mg/dL 0.7 0.6  --   --  0.6   ALK PHOS U/L 69 66  --   --  69   ALT (SGPT) U/L 20 18  --   --  21   AST (SGOT) U/L 21 18  --   --  21   GLUCOSE mg/dL 108* 122*  --  145* 102*       Culture Data:   No results found for: \"BLOODCX\", \"URINECX\", \"WOUNDCX\", \"MRSACX\", \"RESPCX\", \"STOOLCX\"    Radiology Data:   Imaging Results (Last 24 Hours)       Procedure Component " Value Units Date/Time    MRI Brain Without Contrast [[id number]] Collected: 11/17/23 2016     Updated: 11/17/23 2023    Narrative:      EXAMINATION:  MRI BRAIN WO CONTRAST-  11/17/2023 7:30 PM CST     HISTORY: Headache, new or worsening (Age >= 50y); I16.0-Hypertensive  urgency; G44.1-Vascular headache, not elsewhere classified;  E87.6-Hypokalemia.     TECHNIQUE: Multiplanar imaging was performed in a high field magnet.     COMPARISON: No comparison study.     FINDINGS: No structural abnormalities are appreciated. The ventricular  system is nondilated. There are dilated perivascular spaces in the basal  ganglia region bilaterally and in the cerebral hemispheres. There are a  few scattered areas of T2 high signal within the subcortical white  matter. No mass lesions are identified. There are no areas of signal  abnormality on the diffusion weighted sequence.          Impression:      1. No evidence of acute infarct.  2. A few scattered areas of T2 high signal within the subcortical  hemispheric white matter. This is nonspecific. FINDINGS could be related  to chronic ischemic change. Vasculitis is considered. Demyelinating  process less likely.           This report was signed and finalized on 11/17/2023 8:20 PM CST by Dr. Pancho Hobson MD.               I have reviewed the patient's current medications.     Assessment/Plan   Assessment  Active Hospital Problems    Diagnosis     **Hypertensive urgency        Assessment and Plan:  58-year-old male with a past medical history of hypertension admitted for hypertensive emergency.    HTN, likely secondary HTN of undetermined etiology: Pt reports episodic headaches and flushing with facial erythema that are associated with his elevated blood pressure. I am unable to hear renal bruit on exam. He was given lasix 20 IV x1 in the am HCTZ 50 x1 in the pm on 11/17 with much improvement in his BP.  -cont home valsartan  -resume home amlodipine  -discontinue home metoprolol which  would have very little effect on BP and is best used as a rate control medication, it could also exacerbate secondary HTN  -HCTZ 50 po qd, will titrate up to 100 as indicated  -HCTZ 50 qd prn SBP > 160, DBP > 100  -discontinue hydralazine 10 IV prn SBP > 180 which makes titrating his home meds extremely difficult  -CT angiogram abdomen to check for renal artery stenosis since we are unable to obtain renal U/S over the weekend    Headaches likely 2/2 HTN: improved with better BP control  -MRI brain negative for acute pathology      BPH:  -cont home flomax    Anxiety:  -hold home buspar which can affect the results of secondary htn labs  -ativan prn    Hypokalemia, resolved:  -replace K prn      Dispo: BP much better controlled today after the addition of HCTZ, will uptitrate this medication as necessary.  Renal ultrasound unable to be performed over the weekend, CT angiogram of the abdomen ordered.       Filemon Miller MD 11/18/23, 12:54 CST.                  Treatment Plan      Medical Decision Making  Number and Complexity of problems:   Differential Diagnosis:     Conditions and Status             MDM Data  External documents reviewed:   Cardiac tracing (EKG, telemetry) interpretation:   Radiology interpretation:   Labs reviewed:   Any tests that were considered but not ordered:      Decision rules/scores evaluated (example [id number], Wells, etc):      Discussed with:      Care Planning  Shared decision making:   Code status and discussions:     Disposition  Social Determinants of Health that impact treatment or disposition:   I expect the patient to be discharged to  in  days.     Electronically signed by Filemon Miller MD, 11/18/23, 12:54 CST.

## 2023-11-18 NOTE — NURSING NOTE
Spoke with Ultrasound tech regarding renal u/s updated to STAT. Tech stated patient required to be NPO for 8 hours prior to U/S. Patient ate breakfast and was taking in fluids until 1100. No U/S tech available after 1700 today. Patient will be placed as NPO at 0000 tonight and U/S will be done in the AM. Patient made aware.

## 2023-11-19 PROBLEM — E87.6 HYPOKALEMIA: Status: ACTIVE | Noted: 2023-11-19

## 2023-11-19 PROBLEM — I10 ESSENTIAL HYPERTENSION: Status: ACTIVE | Noted: 2023-11-19

## 2023-11-19 PROBLEM — I48.92 ATRIAL FLUTTER WITH RAPID VENTRICULAR RESPONSE: Status: ACTIVE | Noted: 2023-11-19

## 2023-11-19 LAB
ALBUMIN SERPL-MCNC: 3.9 G/DL (ref 3.5–5.2)
ALBUMIN/GLOB SERPL: 1.3 G/DL
ALP SERPL-CCNC: 68 U/L (ref 39–117)
ALT SERPL W P-5'-P-CCNC: 25 U/L (ref 1–41)
ANION GAP SERPL CALCULATED.3IONS-SCNC: 11 MMOL/L (ref 5–15)
ANION GAP SERPL CALCULATED.3IONS-SCNC: 12 MMOL/L (ref 5–15)
AST SERPL-CCNC: 23 U/L (ref 1–40)
BASOPHILS # BLD AUTO: 0.03 10*3/MM3 (ref 0–0.2)
BASOPHILS NFR BLD AUTO: 0.4 % (ref 0–1.5)
BILIRUB SERPL-MCNC: 0.5 MG/DL (ref 0–1.2)
BUN SERPL-MCNC: 18 MG/DL (ref 8–23)
BUN SERPL-MCNC: 19 MG/DL (ref 8–23)
BUN/CREAT SERPL: 15 (ref 7–25)
BUN/CREAT SERPL: 16.5 (ref 7–25)
CALCIUM SPEC-SCNC: 9.3 MG/DL (ref 8.6–10.5)
CALCIUM SPEC-SCNC: 9.8 MG/DL (ref 8.6–10.5)
CHLORIDE SERPL-SCNC: 100 MMOL/L (ref 98–107)
CHLORIDE SERPL-SCNC: 101 MMOL/L (ref 98–107)
CO2 SERPL-SCNC: 24 MMOL/L (ref 22–29)
CO2 SERPL-SCNC: 24 MMOL/L (ref 22–29)
CREAT SERPL-MCNC: 1.15 MG/DL (ref 0.76–1.27)
CREAT SERPL-MCNC: 1.2 MG/DL (ref 0.76–1.27)
DEPRECATED RDW RBC AUTO: 40.7 FL (ref 37–54)
EGFRCR SERPLBLD CKD-EPI 2021: 69.2 ML/MIN/1.73
EGFRCR SERPLBLD CKD-EPI 2021: 72.9 ML/MIN/1.73
EOSINOPHIL # BLD AUTO: 0.16 10*3/MM3 (ref 0–0.4)
EOSINOPHIL NFR BLD AUTO: 1.9 % (ref 0.3–6.2)
ERYTHROCYTE [DISTWIDTH] IN BLOOD BY AUTOMATED COUNT: 11.8 % (ref 12.3–15.4)
GLOBULIN UR ELPH-MCNC: 3.1 GM/DL
GLUCOSE SERPL-MCNC: 110 MG/DL (ref 65–99)
GLUCOSE SERPL-MCNC: 127 MG/DL (ref 65–99)
HCT VFR BLD AUTO: 48 % (ref 37.5–51)
HGB BLD-MCNC: 16 G/DL (ref 13–17.7)
IMM GRANULOCYTES # BLD AUTO: 0.03 10*3/MM3 (ref 0–0.05)
IMM GRANULOCYTES NFR BLD AUTO: 0.4 % (ref 0–0.5)
LYMPHOCYTES # BLD AUTO: 1.6 10*3/MM3 (ref 0.7–3.1)
LYMPHOCYTES NFR BLD AUTO: 19.2 % (ref 19.6–45.3)
MAGNESIUM SERPL-MCNC: 2.3 MG/DL (ref 1.6–2.4)
MCH RBC QN AUTO: 31.1 PG (ref 26.6–33)
MCHC RBC AUTO-ENTMCNC: 33.3 G/DL (ref 31.5–35.7)
MCV RBC AUTO: 93.4 FL (ref 79–97)
MONOCYTES # BLD AUTO: 0.82 10*3/MM3 (ref 0.1–0.9)
MONOCYTES NFR BLD AUTO: 9.8 % (ref 5–12)
NEUTROPHILS NFR BLD AUTO: 5.7 10*3/MM3 (ref 1.7–7)
NEUTROPHILS NFR BLD AUTO: 68.3 % (ref 42.7–76)
NRBC BLD AUTO-RTO: 0 /100 WBC (ref 0–0.2)
PHOSPHATE SERPL-MCNC: 3.6 MG/DL (ref 2.5–4.5)
PLATELET # BLD AUTO: 312 10*3/MM3 (ref 140–450)
PMV BLD AUTO: 10.2 FL (ref 6–12)
POTASSIUM SERPL-SCNC: 3.5 MMOL/L (ref 3.5–5.2)
POTASSIUM SERPL-SCNC: 3.8 MMOL/L (ref 3.5–5.2)
PROT SERPL-MCNC: 7 G/DL (ref 6–8.5)
RBC # BLD AUTO: 5.14 10*6/MM3 (ref 4.14–5.8)
SODIUM SERPL-SCNC: 136 MMOL/L (ref 136–145)
SODIUM SERPL-SCNC: 136 MMOL/L (ref 136–145)
WBC NRBC COR # BLD AUTO: 8.34 10*3/MM3 (ref 3.4–10.8)

## 2023-11-19 PROCEDURE — 80053 COMPREHEN METABOLIC PANEL: CPT | Performed by: INTERNAL MEDICINE

## 2023-11-19 PROCEDURE — 99204 OFFICE O/P NEW MOD 45 MIN: CPT | Performed by: NURSE PRACTITIONER

## 2023-11-19 PROCEDURE — 93005 ELECTROCARDIOGRAM TRACING: CPT | Performed by: INTERNAL MEDICINE

## 2023-11-19 PROCEDURE — 85025 COMPLETE CBC W/AUTO DIFF WBC: CPT | Performed by: INTERNAL MEDICINE

## 2023-11-19 PROCEDURE — G0378 HOSPITAL OBSERVATION PER HR: HCPCS

## 2023-11-19 PROCEDURE — 96372 THER/PROPH/DIAG INJ SC/IM: CPT

## 2023-11-19 PROCEDURE — 82088 ASSAY OF ALDOSTERONE: CPT | Performed by: INTERNAL MEDICINE

## 2023-11-19 PROCEDURE — 84244 ASSAY OF RENIN: CPT | Performed by: INTERNAL MEDICINE

## 2023-11-19 PROCEDURE — 83735 ASSAY OF MAGNESIUM: CPT | Performed by: INTERNAL MEDICINE

## 2023-11-19 PROCEDURE — 84100 ASSAY OF PHOSPHORUS: CPT | Performed by: INTERNAL MEDICINE

## 2023-11-19 PROCEDURE — 25010000002 ENOXAPARIN PER 10 MG: Performed by: INTERNAL MEDICINE

## 2023-11-19 PROCEDURE — 93010 ELECTROCARDIOGRAM REPORT: CPT | Performed by: INTERNAL MEDICINE

## 2023-11-19 PROCEDURE — 96366 THER/PROPH/DIAG IV INF ADDON: CPT

## 2023-11-19 RX ORDER — METOPROLOL SUCCINATE 50 MG/1
50 TABLET, EXTENDED RELEASE ORAL
Status: DISCONTINUED | OUTPATIENT
Start: 2023-11-19 | End: 2023-11-20 | Stop reason: HOSPADM

## 2023-11-19 RX ORDER — POTASSIUM CHLORIDE 750 MG/1
40 CAPSULE, EXTENDED RELEASE ORAL EVERY 4 HOURS
Status: CANCELLED | OUTPATIENT
Start: 2023-11-19 | End: 2023-11-19

## 2023-11-19 RX ORDER — POTASSIUM CHLORIDE 7.45 MG/ML
10 INJECTION INTRAVENOUS
Status: ACTIVE | OUTPATIENT
Start: 2023-11-19 | End: 2023-11-19

## 2023-11-19 RX ORDER — POTASSIUM CHLORIDE 750 MG/1
40 CAPSULE, EXTENDED RELEASE ORAL EVERY 4 HOURS
Status: COMPLETED | OUTPATIENT
Start: 2023-11-19 | End: 2023-11-20

## 2023-11-19 RX ADMIN — METOPROLOL SUCCINATE 50 MG: 50 TABLET, EXTENDED RELEASE ORAL at 10:44

## 2023-11-19 RX ADMIN — TRAZODONE HYDROCHLORIDE 50 MG: 50 TABLET ORAL at 20:31

## 2023-11-19 RX ADMIN — POTASSIUM CHLORIDE 40 MEQ: 750 CAPSULE, EXTENDED RELEASE ORAL at 22:27

## 2023-11-19 RX ADMIN — ENOXAPARIN SODIUM 100 MG: 100 INJECTION SUBCUTANEOUS at 08:15

## 2023-11-19 RX ADMIN — POTASSIUM CHLORIDE 60 MEQ: 10 CAPSULE, COATED, EXTENDED RELEASE ORAL at 08:20

## 2023-11-19 RX ADMIN — DOCUSATE SODIUM 50 MG AND SENNOSIDES 8.6 MG 2 TABLET: 8.6; 5 TABLET, FILM COATED ORAL at 08:27

## 2023-11-19 RX ADMIN — ACETAMINOPHEN 650 MG: 325 TABLET, FILM COATED ORAL at 04:21

## 2023-11-19 RX ADMIN — AMLODIPINE BESYLATE 10 MG: 10 TABLET ORAL at 08:20

## 2023-11-19 RX ADMIN — APIXABAN 5 MG: 5 TABLET, FILM COATED ORAL at 20:31

## 2023-11-19 RX ADMIN — HYDROCHLOROTHIAZIDE 50 MG: 25 TABLET ORAL at 08:19

## 2023-11-19 RX ADMIN — GABAPENTIN 300 MG: 300 CAPSULE ORAL at 08:27

## 2023-11-19 RX ADMIN — GABAPENTIN 300 MG: 300 CAPSULE ORAL at 16:31

## 2023-11-19 RX ADMIN — NICOTINE 1 PATCH: 14 PATCH, EXTENDED RELEASE TRANSDERMAL at 08:19

## 2023-11-19 RX ADMIN — Medication 10 ML: at 08:17

## 2023-11-19 RX ADMIN — Medication 10 ML: at 20:32

## 2023-11-19 RX ADMIN — GABAPENTIN 300 MG: 300 CAPSULE ORAL at 20:31

## 2023-11-19 RX ADMIN — VALSARTAN 320 MG: 80 TABLET, FILM COATED ORAL at 10:44

## 2023-11-19 RX ADMIN — TAMSULOSIN HYDROCHLORIDE 0.4 MG: 0.4 CAPSULE ORAL at 08:20

## 2023-11-19 RX ADMIN — LORAZEPAM 0.5 MG: 0.5 TABLET ORAL at 22:27

## 2023-11-19 NOTE — PROGRESS NOTES
"Pharmacy Dosing Service  Anticoagulant  Enoxaparin    Assessment/Action/Plan:  Lovenox dosed at 1mg/kg 100mg sc q12 based on indication and renal function. Pharmacy will follow daily and adjust as needed.      Subjective:  Deangelo Posadas is a 58 y.o. male   Pharmacy to dose Lovenox for indication of atrial fibrillation.    Objective:  [Ht: 182.9 cm (72.01\"); Wt: 103 kg (226 lb 12.8 oz); BMI: Body mass index is 30.75 kg/m².]  Estimated Creatinine Clearance: 102.7 mL/min (by C-G formula based on SCr of 0.95 mg/dL).    Lab Results   Component Value Date    HGB 15.7 11/18/2023    HGB 14.6 11/17/2023    HGB 14.7 11/15/2023      Lab Results   Component Value Date     11/18/2023     11/17/2023     11/15/2023       CRISTINA Lara, PharmD  11/18/23 18:55 CST     "

## 2023-11-19 NOTE — PLAN OF CARE
Goal Outcome Evaluation:  Plan of Care Reviewed With: patient        Progress: improving  Outcome Evaluation: Patient converted back to NSR at 1949, cardizem gtt titrated down until rate controlled. Cardizem turned off this am at 0400 with rates in the 80s. BP remains well controlled overnight. Cardiology to see patient this am. VSS, safety maintained. NSR  on tele.

## 2023-11-19 NOTE — CONSULTS
"[facility] HEART GROUP CONSULT NOTE    Referring Provider: No ref. provider found    Reason for Consultation: A-flutter RVR    Chief complaint:   Chief Complaint   Patient presents with    Hypertension    Headache       Subjective .     History of present illness:  Deangelo Posadas is a 58 y.o. male who has been admitted since 11/15/23 for hypertensive urgency. Yesterday at roughly 1800 patient had 1.5 hours of A-flutter RVR. He was asymptomatic to this rhythm. He was treated with IV Cardizem and Lovenox. He notes he was not aware of his rhythm until several people entered his room and he noted he became anxious. He denies chest pain, shortness of breath or palpitations. He is on Metoprolol at home which was not resumed on admission. He was also noted to be hypokalemic yesterday with a potassium of 2.8. Patient has a history of hypertension, sleep apnea and prostate disease. He had an echo which showed LVEF 56-60%, mild LVH and no significant valve disease.     History  Past Medical History:   Diagnosis Date    Hypertension     Prostate disease     Sleep apnea    ,   Past Surgical History:   Procedure Laterality Date    BACK SURGERY      CHOLECYSTECTOMY     ,   History reviewed. No pertinent family history.,   Social History     Tobacco Use    Smoking status: Former     Types: Cigarettes    Smokeless tobacco: Never    Tobacco comments:     \"Short time around 6 years sago\"   Vaping Use    Vaping Use: Every day    Substances: Nicotine    Devices: Disposable   Substance Use Topics    Alcohol use: Yes     Alcohol/week: 2.0 standard drinks of alcohol     Types: 2 Cans of beer per week    Drug use: Not Currently   ,     Medications    Prior to Admission medications    Medication Sig Start Date End Date Taking? Authorizing Provider   amLODIPine-valsartan (EXFORGE)  MG per tablet Take 1 tablet by mouth Daily.   Yes Provider, MD Leslye   busPIRone (BUSPAR) 5 MG tablet Take 2 tablets by mouth 2 (Two) Times " a Day As Needed (anxiety).   Yes Leslye Blake MD   cloNIDine (CATAPRES) 0.1 MG tablet Take 1 tablet by mouth Daily As Needed for High Blood Pressure (BLOOD PRESSURE IS ABOVE 190/110.).   Yes Leslye Blake MD   gabapentin (NEURONTIN) 300 MG capsule Take 1 capsule by mouth 2 (Two) Times a Day.   Yes Leslye Blake MD   metoprolol succinate XL (TOPROL-XL) 100 MG 24 hr tablet Take 1 tablet by mouth Daily.   Yes Leslye Blake MD   metoprolol succinate XL (TOPROL-XL) 50 MG 24 hr tablet Take 1 tablet by mouth Every Night.   Yes Leslye Blake MD   potassium citrate (UROCIT-K) 10 MEQ (1080 MG) CR tablet Take 1 tablet by mouth 3 (Three) Times a Day With Meals.   Yes Leslye Blake MD   tamsulosin (FLOMAX) 0.4 MG capsule 24 hr capsule Take 1 capsule by mouth Daily.   Yes Leslye Blake MD   Testosterone Cypionate 200 MG/ML solution Inject 1 mL as directed 1 (One) Time Per Week.   Yes Leslye Blake MD   tiZANidine (ZANAFLEX) 2 MG tablet Take 1 tablet by mouth At Night As Needed for Muscle Spasms.   Yes Leslye Blake MD   traZODone (DESYREL) 50 MG tablet Take 1 tablet by mouth Every Night.   Yes Leslye Blake MD   hydrALAZINE (APRESOLINE) 25 MG tablet Take 1 tablet by mouth Daily With Breakfast for 30 days. 11/15/23 12/15/23  Wallace Cutler Jr., MD       Current Facility-Administered Medications   Medication Dose Route Frequency Provider Last Rate Last Admin    acetaminophen (TYLENOL) tablet 650 mg  650 mg Oral Q6H PRN Adalgisa Mai MD   650 mg at 11/19/23 0421    amLODIPine (NORVASC) tablet 10 mg  10 mg Oral Q24H Filemon Miller MD   10 mg at 11/19/23 0820    sennosides-docusate (PERICOLACE) 8.6-50 MG per tablet 2 tablet  2 tablet Oral BID Adalgisa Mai MD   2 tablet at 11/19/23 0827    And    polyethylene glycol (MIRALAX) packet 17 g  17 g Oral Daily PRN Adalgisa Mai MD        And    bisacodyl (DULCOLAX) EC tablet 5 mg  5  mg Oral Daily PRN Adalgisa Mai MD        And    bisacodyl (DULCOLAX) suppository 10 mg  10 mg Rectal Daily PRN Adalgisa Mai MD        Calcium Replacement - Follow Nurse / BPA Driven Protocol   Does not apply PRN Adalgisa Mai MD        dilTIAZem (CARDIZEM) 125 mg in 125 mL D5W infusion  5-15 mg/hr Intravenous Titrated Filemon Miller MD   Stopped at 11/19/23 0358    Enoxaparin Sodium (LOVENOX) syringe 100 mg  1 mg/kg Subcutaneous Q12H Filemon Miller MD   100 mg at 11/19/23 0815    gabapentin (NEURONTIN) capsule 300 mg  300 mg Oral TID Filemon Miller MD   300 mg at 11/19/23 0827    hydroCHLOROthiazide (HYDRODIURIL) tablet 50 mg  50 mg Oral Daily Filemon Miller MD   50 mg at 11/19/23 0819    hydroCHLOROthiazide (HYDRODIURIL) tablet 50 mg  50 mg Oral Daily PRN Filemon Miller MD        LORazepam (ATIVAN) tablet 0.5 mg  0.5 mg Oral Q6H PRN Filemon Miller MD   0.5 mg at 11/18/23 2217    Magnesium Standard Dose Replacement - Follow Nurse / BPA Driven Protocol   Does not apply PRN Adalgisa Mai MD        metoprolol succinate XL (TOPROL-XL) 24 hr tablet 50 mg  50 mg Oral Q24H Jason Collins APRN        nicotine (NICODERM CQ) 14 MG/24HR patch 1 patch  1 patch Transdermal Q24H Filemon Miller MD   1 patch at 11/19/23 0819    NIFEdipine XL (PROCARDIA XL) 24 hr tablet 30 mg  30 mg Oral Daily PRN Filemon Miller MD        nitroglycerin (NITROSTAT) SL tablet 0.4 mg  0.4 mg Sublingual Q5 Min PRN Adalgisa Mai MD        ondansetron (ZOFRAN) injection 4 mg  4 mg Intravenous Q6H PRN Adalgisa Mai MD   4 mg at 11/16/23 0747    oxyCODONE (ROXICODONE) immediate release tablet 5 mg  5 mg Oral Q4H PRN Aadlgisa Mai MD   5 mg at 11/18/23 0141    Pharmacy to Dose enoxaparin (LOVENOX)   Does not apply Continuous PRN Filemon Miller MD        Phosphorus Replacement - Follow Nurse / BPA Driven Protocol   Does not apply PRN Adalgisa Mai MD        potassium chloride 10  mEq in 100 mL IVPB  10 mEq Intravenous Q1H Filemon Miller MD        Potassium Replacement - Follow Nurse / BPA Driven Protocol   Does not apply PRN Adalgisa Mai MD        promethazine (PHENERGAN) 12.5 mg in sodium chloride 0.9 % 50 mL  12.5 mg Intravenous Q6H PRN Filemon Miller MD   Stopped at 11/16/23 1346    sodium chloride 0.9 % flush 10 mL  10 mL Intravenous Q12H Adalgisa Mai MD   10 mL at 11/19/23 0817    sodium chloride 0.9 % flush 10 mL  10 mL Intravenous PRN Adalgisa Mai MD        sodium chloride 0.9 % infusion 40 mL  40 mL Intravenous PRN Adalgisa Mai MD        tamsulosin (FLOMAX) 24 hr capsule 0.4 mg  0.4 mg Oral Daily Adalgisa Mai MD   0.4 mg at 11/19/23 0820    tiZANidine (ZANAFLEX) tablet 4 mg  4 mg Oral Nightly PRN Adalgisa Mai MD        traZODone (DESYREL) tablet 50 mg  50 mg Oral Nightly Adalgisa Mai MD   50 mg at 11/18/23 2015    valsartan (DIOVAN) tablet 320 mg  320 mg Oral Q24H Filemon Miller MD   320 mg at 11/18/23 0807       Allergies:  Codeine    Review of Systems  Review of Systems   Constitutional: Negative for chills, decreased appetite, fever, malaise/fatigue, weight gain and weight loss.   HENT:  Negative for nosebleeds.    Eyes:  Negative for visual disturbance.   Cardiovascular:  Negative for chest pain, dyspnea on exertion, leg swelling, near-syncope, orthopnea, palpitations, paroxysmal nocturnal dyspnea and syncope.        Elevated blood pressure   Respiratory:  Negative for cough, hemoptysis, shortness of breath and snoring.    Endocrine: Negative for cold intolerance and heat intolerance.   Hematologic/Lymphatic: Negative for bleeding problem. Does not bruise/bleed easily.   Skin:  Negative for rash.   Musculoskeletal:  Negative for back pain and falls.   Gastrointestinal:  Negative for abdominal pain, constipation, diarrhea, heartburn, melena, nausea and vomiting.   Genitourinary:  Negative for hematuria.   Neurological:   Positive for headaches. Negative for dizziness and light-headedness.   Psychiatric/Behavioral:  Negative for altered mental status.    Allergic/Immunologic: Negative for persistent infections.       Objective     Physical Exam:  Patient Vitals for the past 24 hrs:   BP Temp Temp src Pulse Resp SpO2   11/19/23 0700 147/87 98.1 °F (36.7 °C) Oral 79 18 94 %   11/19/23 0358 -- -- -- 82 -- --   11/19/23 0244 132/81 97.9 °F (36.6 °C) Oral 88 16 94 %   11/18/23 2325 129/74 97.9 °F (36.6 °C) Oral 91 16 96 %   11/18/23 2305 -- -- -- 96 -- --   11/18/23 2220 -- -- -- 102 -- --   11/18/23 2219 125/64 -- -- -- -- --   11/18/23 1956 -- -- -- 114 -- --   11/18/23 1944 -- -- -- (!) 138 -- --   11/18/23 1927 142/84 98.1 °F (36.7 °C) Oral (!) 139 17 96 %   11/18/23 1859 126/84 -- -- (!) 130 -- --   11/18/23 1855 -- -- -- (!) 139 -- --   11/18/23 1847 -- -- -- (!) 150 -- --   11/18/23 1836 -- -- -- (!) 142 -- --   11/18/23 1834 132/96 -- -- -- -- --   11/18/23 1820 (!) 151/106 -- -- -- -- --   11/18/23 1649 138/91 98.1 °F (36.7 °C) Oral 94 18 94 %   11/18/23 1139 145/88 98.5 °F (36.9 °C) Oral 93 16 98 %     Constitutional:       Appearance: Healthy appearance. Well-developed and not in distress.   Eyes:      Pupils: Pupils are equal, round, and reactive to light.   HENT:      Head: Normocephalic and atraumatic.   Neck:      Vascular: No carotid bruit or JVD.   Pulmonary:      Effort: Pulmonary effort is normal.      Breath sounds: Normal breath sounds.   Cardiovascular:      Normal rate. Regular rhythm.      Murmurs: There is no murmur.   Pulses:     Intact distal pulses.   Edema:     Peripheral edema absent.   Abdominal:      General: Bowel sounds are normal.      Palpations: Abdomen is soft.   Musculoskeletal: Normal range of motion.      Cervical back: Normal range of motion and neck supple. Skin:     General: Skin is warm and dry.   Neurological:      Mental Status: Alert and oriented to person, place, and time.      Deep Tendon  Reflexes: Reflexes are normal and symmetric.   Psychiatric:         Behavior: Behavior normal.         Thought Content: Thought content normal.         Judgment: Judgment normal.         Results Review:   I reviewed the patient's new clinical results.    Lab Results (last 72 hours)       Procedure Component Value Units Date/Time    Comprehensive Metabolic Panel [[id number]]  (Abnormal) Collected: 11/19/23 0506    Specimen: Blood Updated: 11/19/23 0626     Glucose 110 mg/dL      BUN 19 mg/dL      Creatinine 1.15 mg/dL      Sodium 136 mmol/L      Potassium 3.8 mmol/L      Comment: Slight hemolysis detected by analyzer. Result may be falsely elevated.        Chloride 100 mmol/L      CO2 24.0 mmol/L      Calcium 9.3 mg/dL      Total Protein 7.0 g/dL      Albumin 3.9 g/dL      ALT (SGPT) 25 U/L      AST (SGOT) 23 U/L      Alkaline Phosphatase 68 U/L      Total Bilirubin 0.5 mg/dL      Globulin 3.1 gm/dL      A/G Ratio 1.3 g/dL      BUN/Creatinine Ratio 16.5     Anion Gap 12.0 mmol/L      eGFR 72.9 mL/min/1.73     Narrative:      GFR Normal >60  Chronic Kidney Disease <60  Kidney Failure <15      Magnesium [[id number]]  (Normal) Collected: 11/19/23 0506    Specimen: Blood Updated: 11/19/23 0626     Magnesium 2.3 mg/dL     Phosphorus [[id number]]  (Normal) Collected: 11/19/23 0506    Specimen: Blood Updated: 11/19/23 0626     Phosphorus 3.6 mg/dL     CBC & Differential [[id number]]  (Abnormal) Collected: 11/19/23 0506    Specimen: Blood Updated: 11/19/23 0608    Narrative:      The following orders were created for panel order CBC & Differential.  Procedure                               Abnormality         Status                     ---------                               -----------         ------                     CBC Auto Differential[[id number]]        Abnormal            Final result                 Please view results for these tests on the individual orders.    CBC Auto Differential [[id number]]  (Abnormal) Collected:  11/19/23 0506    Specimen: Blood Updated: 11/19/23 0608     WBC 8.34 10*3/mm3      RBC 5.14 10*6/mm3      Hemoglobin 16.0 g/dL      Hematocrit 48.0 %      MCV 93.4 fL      MCH 31.1 pg      MCHC 33.3 g/dL      RDW 11.8 %      RDW-SD 40.7 fl      MPV 10.2 fL      Platelets 312 10*3/mm3      Neutrophil % 68.3 %      Lymphocyte % 19.2 %      Monocyte % 9.8 %      Eosinophil % 1.9 %      Basophil % 0.4 %      Immature Grans % 0.4 %      Neutrophils, Absolute 5.70 10*3/mm3      Lymphocytes, Absolute 1.60 10*3/mm3      Monocytes, Absolute 0.82 10*3/mm3      Eosinophils, Absolute 0.16 10*3/mm3      Basophils, Absolute 0.03 10*3/mm3      Immature Grans, Absolute 0.03 10*3/mm3      nRBC 0.0 /100 WBC     Potassium [[id number]]  (Abnormal) Collected: 11/18/23 1915    Specimen: Blood Updated: 11/18/23 1941     Potassium 3.1 mmol/L     Protime-INR [[id number]]  (Abnormal) Collected: 11/18/23 1915    Specimen: Blood Updated: 11/18/23 1937     Protime 14.7 Seconds      INR 1.14    Comprehensive Metabolic Panel [[id number]]  (Abnormal) Collected: 11/18/23 0505    Specimen: Blood Updated: 11/18/23 0643     Glucose 108 mg/dL      BUN 16 mg/dL      Creatinine 0.95 mg/dL      Sodium 138 mmol/L      Potassium 2.8 mmol/L      Chloride 99 mmol/L      CO2 27.0 mmol/L      Calcium 9.1 mg/dL      Total Protein 7.5 g/dL      Albumin 4.2 g/dL      ALT (SGPT) 20 U/L      AST (SGOT) 21 U/L      Alkaline Phosphatase 69 U/L      Total Bilirubin 0.7 mg/dL      Globulin 3.3 gm/dL      A/G Ratio 1.3 g/dL      BUN/Creatinine Ratio 16.8     Anion Gap 12.0 mmol/L      eGFR 91.6 mL/min/1.73     Narrative:      GFR Normal >60  Chronic Kidney Disease <60  Kidney Failure <15      CBC & Differential [[id number]]  (Abnormal) Collected: 11/18/23 0505    Specimen: Blood Updated: 11/18/23 0617    Narrative:      The following orders were created for panel order CBC & Differential.  Procedure                               Abnormality         Status                      ---------                               -----------         ------                     CBC Auto Differential[[id number]]        Abnormal            Final result                 Please view results for these tests on the individual orders.    CBC Auto Differential [[id number]]  (Abnormal) Collected: 11/18/23 0505    Specimen: Blood Updated: 11/18/23 0617     WBC 7.49 10*3/mm3      RBC 5.01 10*6/mm3      Hemoglobin 15.7 g/dL      Hematocrit 47.5 %      MCV 94.8 fL      MCH 31.3 pg      MCHC 33.1 g/dL      RDW 12.1 %      RDW-SD 42.5 fl      MPV 10.4 fL      Platelets 298 10*3/mm3      Neutrophil % 66.4 %      Lymphocyte % 22.4 %      Monocyte % 7.9 %      Eosinophil % 2.7 %      Basophil % 0.3 %      Immature Grans % 0.3 %      Neutrophils, Absolute 4.98 10*3/mm3      Lymphocytes, Absolute 1.68 10*3/mm3      Monocytes, Absolute 0.59 10*3/mm3      Eosinophils, Absolute 0.20 10*3/mm3      Basophils, Absolute 0.02 10*3/mm3      Immature Grans, Absolute 0.02 10*3/mm3      nRBC 0.0 /100 WBC     TSH [[id number]]  (Normal) Collected: 11/17/23 0525    Specimen: Blood Updated: 11/17/23 0605     TSH 0.843 uIU/mL     Magnesium [[id number]]  (Normal) Collected: 11/17/23 0525    Specimen: Blood Updated: 11/17/23 0605     Magnesium 2.2 mg/dL     Comprehensive Metabolic Panel [[id number]]  (Abnormal) Collected: 11/17/23 0525    Specimen: Blood Updated: 11/17/23 0605     Glucose 122 mg/dL      BUN 17 mg/dL      Creatinine 0.92 mg/dL      Sodium 140 mmol/L      Potassium 3.7 mmol/L      Chloride 106 mmol/L      CO2 26.0 mmol/L      Calcium 9.1 mg/dL      Total Protein 7.0 g/dL      Albumin 4.1 g/dL      ALT (SGPT) 18 U/L      AST (SGOT) 18 U/L      Alkaline Phosphatase 66 U/L      Total Bilirubin 0.6 mg/dL      Globulin 2.9 gm/dL      A/G Ratio 1.4 g/dL      BUN/Creatinine Ratio 18.5     Anion Gap 8.0 mmol/L      eGFR 95.2 mL/min/1.73     Narrative:      GFR Normal >60  Chronic Kidney Disease <60  Kidney Failure <15      CBC &  "Differential [[id number]]  (Abnormal) Collected: 11/17/23 0525    Specimen: Blood Updated: 11/17/23 0535    Narrative:      The following orders were created for panel order CBC & Differential.  Procedure                               Abnormality         Status                     ---------                               -----------         ------                     CBC Auto Differential[[id number]]        Abnormal            Final result                 Please view results for these tests on the individual orders.    CBC Auto Differential [[id number]]  (Abnormal) Collected: 11/17/23 0525    Specimen: Blood Updated: 11/17/23 0535     WBC 7.54 10*3/mm3      RBC 4.67 10*6/mm3      Hemoglobin 14.6 g/dL      Hematocrit 45.4 %      MCV 97.2 fL      MCH 31.3 pg      MCHC 32.2 g/dL      RDW 12.2 %      RDW-SD 43.5 fl      MPV 10.5 fL      Platelets 285 10*3/mm3      Neutrophil % 66.3 %      Lymphocyte % 22.5 %      Monocyte % 8.4 %      Eosinophil % 1.9 %      Basophil % 0.5 %      Immature Grans % 0.4 %      Neutrophils, Absolute 5.00 10*3/mm3      Lymphocytes, Absolute 1.70 10*3/mm3      Monocytes, Absolute 0.63 10*3/mm3      Eosinophils, Absolute 0.14 10*3/mm3      Basophils, Absolute 0.04 10*3/mm3      Immature Grans, Absolute 0.03 10*3/mm3      nRBC 0.0 /100 WBC     Metanephrines, Frac. Free, Plasma [[id number]] Collected: 11/17/23 0525    Specimen: Blood Updated: 11/17/23 0530    Potassium [[id number]]  (Normal) Collected: 11/16/23 1728    Specimen: Blood Updated: 11/16/23 1808     Potassium 4.1 mmol/L     Aldosterone / Renin Ratio [[id number]] Collected: 11/16/23 1201    Specimen: Blood Updated: 11/16/23 1212            No results found for: \"ECHOEFEST\"    Imaging Results (Last 72 Hours)       Procedure Component Value Units Date/Time    CT Angiogram Abdomen [[id number]] Collected: 11/18/23 1423     Updated: 11/18/23 1439    Narrative:      EXAM/TECHNIQUE: CT angiography with 3D MIP images abdomen with IV  contrast   "   INDICATION: Renal artery stenosis; I16.0-Hypertensive urgency;  G44.1-Vascular headache, not elsewhere classified; E87.6-Hypokalemia     COMPARISON: 11/15/2023     DLP: 829 mGy cm. Automated exposure control was also utilized to  decrease patient radiation dose.     FINDINGS:     VASCULATURE: Nonaneurysmal abdominal aorta with mild atherosclerotic  plaque. The celiac artery, common hepatic artery, splenic artery, and  left gastric artery are widely patent. The SMA and ANDREY are widely  patent. Renal arteries are widely patent. No atherosclerotic disease in  the renal arteries.     LOWER CHEST: Persistent small bilateral pleural effusions with overlying  atelectasis.     LIVER: Redemonstrated scattered low-density liver lesions which are  incompletely characterized. No arterial enhancing liver lesion.     BILIARY: The gallbladder is surgically absent. No biliary dilatation.     PANCREAS: Unremarkable.      SPLEEN: Mildly enlarged measuring 13.1 cm.     ADRENAL: Unremarkable.     KIDNEYS/URETERS/BLADDER: Punctate nonobstructing right upper pole renal  calculus. No ureteral stones or hydronephrosis in the abdomen. No solid  renal mass.     BOWEL: No bowel distention or active bowel inflammation in the abdomen.     PERITONEUM: No ascites or free pelvic fluid.      LYMPH NODES: No enlarged lymph nodes.      SOFT TISSUES: Unremarkable.      BONES: No acute osseus findings.       Impression:         1.  Renal arteries are widely patent without significant atherosclerotic  plaque. No renal artery stenosis.     2.  Punctate nonobstructing right upper pole renal calculus.     3.  Persistent small bilateral pleural effusions with overlying  atelectasis.     4.  Mild splenomegaly.           This report was signed and finalized on 11/18/2023 2:36 PM CST by Dr. Mario Hammer MD.       MRI Brain Without Contrast [[id number]] Collected: 11/17/23 2016     Updated: 11/17/23 2023    Narrative:      EXAMINATION:  MRI BRAIN WO  CONTRAST-  11/17/2023 7:30 PM CST     HISTORY: Headache, new or worsening (Age >= 50y); I16.0-Hypertensive  urgency; G44.1-Vascular headache, not elsewhere classified;  E87.6-Hypokalemia.     TECHNIQUE: Multiplanar imaging was performed in a high field magnet.     COMPARISON: No comparison study.     FINDINGS: No structural abnormalities are appreciated. The ventricular  system is nondilated. There are dilated perivascular spaces in the basal  ganglia region bilaterally and in the cerebral hemispheres. There are a  few scattered areas of T2 high signal within the subcortical white  matter. No mass lesions are identified. There are no areas of signal  abnormality on the diffusion weighted sequence.          Impression:      1. No evidence of acute infarct.  2. A few scattered areas of T2 high signal within the subcortical  hemispheric white matter. This is nonspecific. FINDINGS could be related  to chronic ischemic change. Vasculitis is considered. Demyelinating  process less likely.           This report was signed and finalized on 11/17/2023 8:20 PM CST by Dr. Pancho Hobson MD.             Assessment & Plan       Hypertensive urgency    Atrial flutter with rapid ventricular response    Essential hypertension    Hypokalemia    Plan:  Hypertensive Urgency - management per primary team.     Atrial Flutter RVR - 1.5 hour episodes last evening. He was asymptomatic. [id number]- score is 1. Lengthy discussion had about stroke risk. Would recommend oral anticoagulation at this time- I did discuss this may not be long term pending on recurrence of rhythm. Replace potassium per primary team. Reviewed echo. Patient was on Toprol at home. Will start Toprol 50. 14 day holter monitor at discharge. And follow up in cardiology clinic.     Hypertension - blood pressure management per primary team    Hypokalemia - Replacement per primary team    Further orders per Dr. Oliver     Cardiology will sign off. Please call if can be  of further assistance.       Electronically signed by CINDY Rudd, 11/19/23, 8:44 AM CST.

## 2023-11-19 NOTE — NURSING NOTE
Patient has been S/ST  all shift until 1802 when he converted into A FIB/RVR in the 160s. Dr Purdy notified and came to bedside. EKG performed after some equipment failure, confirming AFlutter. Two Cardizem boluses given and a gtt initiated and titrated up to 10mg/hr. HR down to 110-130s by shift change at 1900. Wife updated with situation; she expressed desire for a cardiology consult. Daughter had also called; I returned her call and updated her. She was adamant that cardiology be consulted.    Patient's headache has been well-controlled today. Only tylenol given as needed. Good urine output. Safety maintained.

## 2023-11-20 ENCOUNTER — APPOINTMENT (OUTPATIENT)
Dept: CARDIOLOGY | Facility: HOSPITAL | Age: 58
End: 2023-11-20
Payer: COMMERCIAL

## 2023-11-20 ENCOUNTER — TELEPHONE (OUTPATIENT)
Dept: CARDIOLOGY | Facility: CLINIC | Age: 58
End: 2023-11-20

## 2023-11-20 VITALS
DIASTOLIC BLOOD PRESSURE: 70 MMHG | OXYGEN SATURATION: 94 % | BODY MASS INDEX: 30.72 KG/M2 | RESPIRATION RATE: 16 BRPM | HEIGHT: 72 IN | SYSTOLIC BLOOD PRESSURE: 130 MMHG | TEMPERATURE: 98.1 F | WEIGHT: 226.8 LBS | HEART RATE: 72 BPM

## 2023-11-20 LAB
ALBUMIN SERPL-MCNC: 4.1 G/DL (ref 3.5–5.2)
ALBUMIN/GLOB SERPL: 1.4 G/DL
ALP SERPL-CCNC: 66 U/L (ref 39–117)
ALT SERPL W P-5'-P-CCNC: 45 U/L (ref 1–41)
ANION GAP SERPL CALCULATED.3IONS-SCNC: 9 MMOL/L (ref 5–15)
AST SERPL-CCNC: 41 U/L (ref 1–40)
BASOPHILS # BLD AUTO: 0.03 10*3/MM3 (ref 0–0.2)
BASOPHILS NFR BLD AUTO: 0.4 % (ref 0–1.5)
BILIRUB SERPL-MCNC: 0.4 MG/DL (ref 0–1.2)
BUN SERPL-MCNC: 19 MG/DL (ref 8–23)
BUN/CREAT SERPL: 16.2 (ref 7–25)
CALCIUM SPEC-SCNC: 9.6 MG/DL (ref 8.6–10.5)
CHLORIDE SERPL-SCNC: 104 MMOL/L (ref 98–107)
CO2 SERPL-SCNC: 24 MMOL/L (ref 22–29)
CREAT SERPL-MCNC: 1.17 MG/DL (ref 0.76–1.27)
DEPRECATED RDW RBC AUTO: 41.6 FL (ref 37–54)
EGFRCR SERPLBLD CKD-EPI 2021: 71.4 ML/MIN/1.73
EOSINOPHIL # BLD AUTO: 0.25 10*3/MM3 (ref 0–0.4)
EOSINOPHIL NFR BLD AUTO: 3.3 % (ref 0.3–6.2)
ERYTHROCYTE [DISTWIDTH] IN BLOOD BY AUTOMATED COUNT: 12 % (ref 12.3–15.4)
GLOBULIN UR ELPH-MCNC: 2.9 GM/DL
GLUCOSE SERPL-MCNC: 97 MG/DL (ref 65–99)
HCT VFR BLD AUTO: 46.3 % (ref 37.5–51)
HGB BLD-MCNC: 15.2 G/DL (ref 13–17.7)
IMM GRANULOCYTES # BLD AUTO: 0.03 10*3/MM3 (ref 0–0.05)
IMM GRANULOCYTES NFR BLD AUTO: 0.4 % (ref 0–0.5)
LYMPHOCYTES # BLD AUTO: 1.66 10*3/MM3 (ref 0.7–3.1)
LYMPHOCYTES NFR BLD AUTO: 22.2 % (ref 19.6–45.3)
MCH RBC QN AUTO: 30.9 PG (ref 26.6–33)
MCHC RBC AUTO-ENTMCNC: 32.8 G/DL (ref 31.5–35.7)
MCV RBC AUTO: 94.1 FL (ref 79–97)
MONOCYTES # BLD AUTO: 0.95 10*3/MM3 (ref 0.1–0.9)
MONOCYTES NFR BLD AUTO: 12.7 % (ref 5–12)
NEUTROPHILS NFR BLD AUTO: 4.55 10*3/MM3 (ref 1.7–7)
NEUTROPHILS NFR BLD AUTO: 61 % (ref 42.7–76)
NRBC BLD AUTO-RTO: 0 /100 WBC (ref 0–0.2)
PLATELET # BLD AUTO: 305 10*3/MM3 (ref 140–450)
PMV BLD AUTO: 10.2 FL (ref 6–12)
POTASSIUM SERPL-SCNC: 4.3 MMOL/L (ref 3.5–5.2)
PROT SERPL-MCNC: 7 G/DL (ref 6–8.5)
QT INTERVAL: 274 MS
QT INTERVAL: 382 MS
QTC INTERVAL: 448 MS
QTC INTERVAL: 455 MS
RBC # BLD AUTO: 4.92 10*6/MM3 (ref 4.14–5.8)
SODIUM SERPL-SCNC: 137 MMOL/L (ref 136–145)
WBC NRBC COR # BLD AUTO: 7.47 10*3/MM3 (ref 3.4–10.8)

## 2023-11-20 PROCEDURE — 93246 EXT ECG>7D<15D RECORDING: CPT

## 2023-11-20 PROCEDURE — G0378 HOSPITAL OBSERVATION PER HR: HCPCS

## 2023-11-20 PROCEDURE — 80053 COMPREHEN METABOLIC PANEL: CPT | Performed by: INTERNAL MEDICINE

## 2023-11-20 PROCEDURE — 85025 COMPLETE CBC W/AUTO DIFF WBC: CPT | Performed by: INTERNAL MEDICINE

## 2023-11-20 RX ORDER — SPIRONOLACTONE 25 MG/1
25 TABLET ORAL DAILY
Status: DISCONTINUED | OUTPATIENT
Start: 2023-11-20 | End: 2023-11-20 | Stop reason: HOSPADM

## 2023-11-20 RX ORDER — NICOTINE 21 MG/24HR
1 PATCH, TRANSDERMAL 24 HOURS TRANSDERMAL
Qty: 30 PATCH | Refills: 2 | Status: SHIPPED | OUTPATIENT
Start: 2023-11-21

## 2023-11-20 RX ORDER — AMLODIPINE BESYLATE 10 MG/1
10 TABLET ORAL
Qty: 90 TABLET | Refills: 0 | Status: SHIPPED | OUTPATIENT
Start: 2023-11-21

## 2023-11-20 RX ORDER — SPIRONOLACTONE 25 MG/1
25 TABLET ORAL DAILY
Qty: 90 TABLET | Refills: 0 | Status: SHIPPED | OUTPATIENT
Start: 2023-11-21

## 2023-11-20 RX ORDER — VALSARTAN 320 MG/1
320 TABLET ORAL
Qty: 90 TABLET | Refills: 0 | Status: SHIPPED | OUTPATIENT
Start: 2023-11-21

## 2023-11-20 RX ORDER — METOPROLOL SUCCINATE 50 MG/1
50 TABLET, EXTENDED RELEASE ORAL
Qty: 90 TABLET | Refills: 0 | Status: SHIPPED | OUTPATIENT
Start: 2023-11-21

## 2023-11-20 RX ADMIN — GABAPENTIN 300 MG: 300 CAPSULE ORAL at 09:36

## 2023-11-20 RX ADMIN — POTASSIUM CHLORIDE 40 MEQ: 750 CAPSULE, EXTENDED RELEASE ORAL at 02:27

## 2023-11-20 RX ADMIN — TAMSULOSIN HYDROCHLORIDE 0.4 MG: 0.4 CAPSULE ORAL at 09:37

## 2023-11-20 RX ADMIN — ACETAMINOPHEN 650 MG: 325 TABLET, FILM COATED ORAL at 13:08

## 2023-11-20 RX ADMIN — Medication 10 ML: at 09:37

## 2023-11-20 RX ADMIN — VALSARTAN 320 MG: 80 TABLET, FILM COATED ORAL at 11:34

## 2023-11-20 RX ADMIN — APIXABAN 5 MG: 5 TABLET, FILM COATED ORAL at 09:37

## 2023-11-20 RX ADMIN — AMLODIPINE BESYLATE 10 MG: 10 TABLET ORAL at 09:37

## 2023-11-20 RX ADMIN — SPIRONOLACTONE 25 MG: 25 TABLET ORAL at 09:36

## 2023-11-20 RX ADMIN — METOPROLOL SUCCINATE 50 MG: 50 TABLET, EXTENDED RELEASE ORAL at 09:37

## 2023-11-20 RX ADMIN — NICOTINE 1 PATCH: 14 PATCH, EXTENDED RELEASE TRANSDERMAL at 09:39

## 2023-11-20 NOTE — DISCHARGE SUMMARY
[facility] Medicine Services  DISCHARGE SUMMARY       Date of Admission: 11/15/2023  Date of Discharge:  11/20/2023  Primary Care Physician: Christiane Mancera APRN    Presenting Problem/History of Present Illness:      Final Discharge Diagnoses:  Active Hospital Problems    Diagnosis     **Hypertensive urgency     Atrial flutter with rapid ventricular response     Essential hypertension     Hypokalemia      Consults: Cardiology.    Pertinent Test Results:   Results for orders placed during the hospital encounter of 11/15/23    Adult Transthoracic Echo Complete W/ Cont if Necessary Per Protocol    Interpretation Summary    Left ventricular systolic function is normal. Left ventricular ejection fraction appears to be 56 - 60%.    Left ventricular wall thickness is consistent with mild concentric hypertrophy.    Normal right ventricular cavity size and systolic function noted.    No significant (greater than mild) valvular abnormalities identified on this study.      Imaging Results (All)       Procedure Component Value Units Date/Time    CT Angiogram Abdomen [[id number]] Collected: 11/18/23 1423     Updated: 11/18/23 1439    Narrative:      EXAM/TECHNIQUE: CT angiography with 3D MIP images abdomen with IV  contrast     INDICATION: Renal artery stenosis; I16.0-Hypertensive urgency;  G44.1-Vascular headache, not elsewhere classified; E87.6-Hypokalemia     COMPARISON: 11/15/2023     DLP: 829 mGy cm. Automated exposure control was also utilized to  decrease patient radiation dose.     FINDINGS:     VASCULATURE: Nonaneurysmal abdominal aorta with mild atherosclerotic  plaque. The celiac artery, common hepatic artery, splenic artery, and  left gastric artery are widely patent. The SMA and ANDREY are widely  patent. Renal arteries are widely patent. No atherosclerotic disease in  the renal arteries.     LOWER CHEST: Persistent small bilateral pleural effusions with overlying  atelectasis.      LIVER: Redemonstrated scattered low-density liver lesions which are  incompletely characterized. No arterial enhancing liver lesion.     BILIARY: The gallbladder is surgically absent. No biliary dilatation.     PANCREAS: Unremarkable.      SPLEEN: Mildly enlarged measuring 13.1 cm.     ADRENAL: Unremarkable.     KIDNEYS/URETERS/BLADDER: Punctate nonobstructing right upper pole renal  calculus. No ureteral stones or hydronephrosis in the abdomen. No solid  renal mass.     BOWEL: No bowel distention or active bowel inflammation in the abdomen.     PERITONEUM: No ascites or free pelvic fluid.      LYMPH NODES: No enlarged lymph nodes.      SOFT TISSUES: Unremarkable.      BONES: No acute osseus findings.       Impression:         1.  Renal arteries are widely patent without significant atherosclerotic  plaque. No renal artery stenosis.     2.  Punctate nonobstructing right upper pole renal calculus.     3.  Persistent small bilateral pleural effusions with overlying  atelectasis.     4.  Mild splenomegaly.           This report was signed and finalized on 11/18/2023 2:36 PM CST by Dr. Mario Hammer MD.       MRI Brain Without Contrast [[id number]] Collected: 11/17/23 2016     Updated: 11/17/23 2023    Narrative:      EXAMINATION:  MRI BRAIN WO CONTRAST-  11/17/2023 7:30 PM CST     HISTORY: Headache, new or worsening (Age >= 50y); I16.0-Hypertensive  urgency; G44.1-Vascular headache, not elsewhere classified;  E87.6-Hypokalemia.     TECHNIQUE: Multiplanar imaging was performed in a high field magnet.     COMPARISON: No comparison study.     FINDINGS: No structural abnormalities are appreciated. The ventricular  system is nondilated. There are dilated perivascular spaces in the basal  ganglia region bilaterally and in the cerebral hemispheres. There are a  few scattered areas of T2 high signal within the subcortical white  matter. No mass lesions are identified. There are no areas of signal  abnormality on the  diffusion weighted sequence.          Impression:      1. No evidence of acute infarct.  2. A few scattered areas of T2 high signal within the subcortical  hemispheric white matter. This is nonspecific. FINDINGS could be related  to chronic ischemic change. Vasculitis is considered. Demyelinating  process less likely.           This report was signed and finalized on 11/17/2023 8:20 PM CST by Dr. Pancho Hobson MD.       CT Abdomen Pelvis With Contrast [[id number]] Collected: 11/15/23 1627     Updated: 11/15/23 1638    Narrative:      EXAMINATION:  CT ABDOMEN PELVIS W CONTRAST-  11/15/2023 4:14 PM CST     HISTORY: Middle to the right lower quadrant abdominal pain. Hematemesis.     TECHNIQUE: Spiral CT was performed of the abdomen and pelvis with IV  contrast. Multiplanar images were reconstructed.     DLP: 510 mGy-cm. Automated dosage reduction technique was utilized to  reduce patient dose.     COMPARISON: No comparison study.      LUNG BASES: There are small pleural effusions bilaterally right greater  than left. There is at least passive atelectasis in both lower lobes.  There is cardiomegaly.     LIVER AND SPLEEN: There are scattered low-density liver lesions. These  are probably cysts but not fully evaluated on this single contrast  study. There is a 1.4 cm subcapsular lesion in the left lobe anteriorly  image 14 series 3. There is a 2.2 cm lesion in the right hepatic lobe  laterally that is subcapsular image 29 series 3. There are other  scattered subcentimeter similar density lesions. The spleen measures 14  cm in length. There has been prior cholecystectomy. There is no ductal  dilatation.     PANCREAS: No pancreatic mass or inflammatory change. The pancreatic duct  is normal.     KIDNEYS AND ADRENALS: The kidneys and adrenal glands are unremarkable.  No bladder abnormality is detected.     BOWEL: Gastric wall thickening is likely due to under distention. Small  bowel loops are nondilated. There is under  distention of the colon. The  appendix is normal in caliber.     OTHER: The prostate is enlarged measuring 5.6 cm transversely. There is  a small fat-containing left inguinal hernia. There is a 1.2 cm  fat-containing umbilical hernia. There are no pathologically enlarged  lymph nodes. There are degenerative changes of the spine.          Impression:      1. Small bilateral pleural effusions with at least passive atelectasis  in both lower lobes. Cardiomegaly.  2. Scattered low-density liver lesions are probably cysts but are not  fully characterized on this single contrast study. The largest is in the  right hepatic lobe measuring 2.2 cm.  3. Splenomegaly.  4. Enlarged prostate.  5. Gastric wall thickening likely due to underdistention. Normal  appendix. No small bowel obstruction is seen. Under distention of most  of the colon.  6. Small fat-containing left inguinal and umbilical hernias. Other  nonacute findings, as discussed.        The full report of this exam was immediately signed and available to the  emergency room. The patient is currently in the emergency room.     This report was signed and finalized on 11/15/2023 4:35 PM CST by Dr. Pancho Hobson MD.       CT Head Without Contrast [[id number]] Collected: 11/15/23 1627     Updated: 11/15/23 1635    Narrative:      Exam: CT HEAD WO CONTRAST- 11/15/2023 4:14 PM CST     HISTORY: Severe headache hypertensive       DOSE LENGTH PRODUCT: 845 mGy cm. Automated exposure control was also  utilized to decrease patient radiation dose.     Technique:   Helically acquired CT of the brain without IV contrast was performed.  Sagittal and coronal reformations are also provided for review. Soft  tissue and bone kernels are available for interpretation.     Comparison: None.     Findings:      Ventricles and extra-axial CSF spaces are normal in size.     No intraparenchymal or extra-axial hemorrhage.     Gray-white matter differentiation is preserved.     Orbits are grossly  unremarkable. Paranasal sinuses are grossly clear.  Mastoid air cells are grossly clear.     No suspicious calvarial or extracranial soft tissue abnormality.     Other:None.       Impression:      Impression:       No acute intracranial abnormality.     This report was signed and finalized on 11/15/2023 4:32 PM CST by Erik Perera.             LAB RESULTS:      Lab 11/20/23  0509 11/19/23  0506 11/18/23  1915 11/18/23  0505 11/17/23  0525 11/15/23  1502   WBC 7.47 8.34  --  7.49 7.54 9.51   HEMOGLOBIN 15.2 16.0  --  15.7 14.6 14.7   HEMATOCRIT 46.3 48.0  --  47.5 45.4 44.7   PLATELETS 305 312  --  298 285 311   NEUTROS ABS 4.55 5.70  --  4.98 5.00 6.50   IMMATURE GRANS (ABS) 0.03 0.03  --  0.02 0.03 0.03   LYMPHS ABS 1.66 1.60  --  1.68 1.70 2.04   MONOS ABS 0.95* 0.82  --  0.59 0.63 0.79   EOS ABS 0.25 0.16  --  0.20 0.14 0.11   MCV 94.1 93.4  --  94.8 97.2* 95.1   PROTIME  --   --  14.7  --   --   --          Lab 11/20/23  0509 11/19/23  1841 11/19/23  0506 11/18/23  1915 11/18/23  0505 11/17/23  0525   SODIUM 137 136 136  --  138 140   POTASSIUM 4.3 3.5 3.8 3.1* 2.8* 3.7   CHLORIDE 104 101 100  --  99 106   CO2 24.0 24.0 24.0  --  27.0 26.0   ANION GAP 9.0 11.0 12.0  --  12.0 8.0   BUN 19 18 19  --  16 17   CREATININE 1.17 1.20 1.15  --  0.95 0.92   EGFR 71.4 69.2 72.9  --  91.6 95.2   GLUCOSE 97 127* 110*  --  108* 122*   CALCIUM 9.6 9.8 9.3  --  9.1 9.1   MAGNESIUM  --   --  2.3  --   --  2.2   PHOSPHORUS  --   --  3.6  --   --   --    TSH  --   --   --   --   --  0.843         Lab 11/20/23  0509 11/19/23  0506 11/18/23  0505 11/17/23  0525 11/15/23  1502   TOTAL PROTEIN 7.0 7.0 7.5 7.0 7.2   ALBUMIN 4.1 3.9 4.2 4.1 4.3   GLOBULIN 2.9 3.1 3.3 2.9 2.9   ALT (SGPT) 45* 25 20 18 21   AST (SGOT) 41* 23 21 18 21   BILIRUBIN 0.4 0.5 0.7 0.6 0.6   ALK PHOS 66 68 69 66 69   LIPASE  --   --   --   --  51         Lab 11/18/23  1915 11/15/23  1502   HSTROP T  --  14   PROTIME 14.7  --    INR 1.14*  --                   Brief Urine Lab Results       None          Microbiology Results (last 10 days)       ** No results found for the last 240 hours. **        HPI.  Hypertension  Associated symptoms include headaches.   Headache     58 years old man with history of hypertension, depression, BPH, sleep apnea on CPAP with home, came to ER complaining of elevated blood pressure for the last few days despite taking his medication.  The patient stated he is not compliant with low-salt diet.  In ER he was found with blood pressure systolic over 200, unable to control after 2 doses of hydralazine IV and labetalol IV.  He was treated with Dilaudid and morphine for headache and right upper quadrant pain.  The patient was placed on Cardene drip, currently blood pressure pain control and headache resolved.  The patient described a headache for the last few days but temporal severe and throbbing.  Also had mild shortness of breath.  No chest pain or dizziness and no change in vision.  Mental status is normal.  CT head not showing any acute abnormalities, the abdomen and pelvis nothing to explain his abdominal pain.     I examined the patient in the emergency room, his wife was present at bedside.     Hospital Course:     Hypertension/atrial fibs.  Norvasc.  Eliquis.  Toprol.  Aldactone.  Diovan.  Patient go home with a Zio patch.  Echocardiogram-    Left ventricular systolic function is normal. Left ventricular ejection fraction appears to be 56 - 60%.    Left ventricular wall thickness is consistent with mild concentric hypertrophy.    Normal right ventricular cavity size and systolic function noted.    No significant (greater than mild) valvular abnormalities identified on this study.     Depression.  Trazodone at night.    MRI of the head- No evidence of acute infarct, few scattered areas of T2 high signal within the subcortical hemispheric white matter-nonspecific- FINDINGS could be related to chronic ischemic change,  Vasculitis is  "considered. Demyelinating  process less likely.    Hypokalemia.  Resolved.  Magnesium normal.    Sleep apnea on CPAP at home.    Patient vape at home.  Patient instructed to stop.  Nicotine patch.    Chronic pain.  Neurontin.  CTA of abdomen pelvic-Renal arteries are widely patent without significant  atherosclerotic plaque-No renal artery stenosis, Punctate nonobstructing right upper pole renal calculus, Persistent small bilateral pleural effusions with overlying  Atelectasis, Mild splenomegaly.    Benign prostate hypertrophy-Flomax.    Vital signs stable, afebrile.  Plan to discharge patient home today.  Follow with PCP 1 week.  Follow-up with cardiology in 1 month.  Patient go home with a Zio patch.    Physical Exam on Discharge:  /70 (BP Location: Right arm, Patient Position: Lying)   Pulse 72   Temp 98.1 °F (36.7 °C) (Oral)   Resp 16   Ht 182.9 cm (72.01\")   Wt 103 kg (226 lb 12.8 oz)   SpO2 94%   BMI 30.75 kg/m²   Physical Exam  Vitals and nursing note reviewed.   HENT:      Head: Normocephalic and atraumatic.   Eyes:      Conjunctiva/sclera: Conjunctivae normal.      Pupils: Pupils are equal, round, and reactive to light.   Cardiovascular:      Rate and Rhythm: Normal rate and regular rhythm.      Heart sounds: Normal heart sounds.   Pulmonary:      Effort: Pulmonary effort is normal. No respiratory distress.      Breath sounds: Normal breath sounds.   Abdominal:      General: Bowel sounds are normal. There is no distension.      Palpations: Abdomen is soft.      Tenderness: There is no abdominal tenderness.   Musculoskeletal:         General: No swelling.      Cervical back: Neck supple.   Skin:     General: Skin is warm and dry.      Findings: No rash.   Neurological:      General: No focal deficit present.      Mental Status: He is alert and oriented to person, place, and time.   Psychiatric:         Mood and Affect: Mood normal.         Behavior: Behavior normal.         Thought Content: " Thought content normal.         Judgment: Judgment normal.       Condition on Discharge: Stable    Discharge Disposition: Discharge home with family  Home or Self Care    Discharge Medications:     Discharge Medications        New Medications        Instructions Start Date   amLODIPine 10 MG tablet  Commonly known as: NORVASC   10 mg, Oral, Every 24 Hours Scheduled   Start Date: November 21, 2023     apixaban 5 MG tablet tablet  Commonly known as: ELIQUIS   5 mg, Oral, Every 12 Hours Scheduled      hydrALAZINE 25 MG tablet  Commonly known as: APRESOLINE   25 mg, Oral, Daily With Breakfast      nicotine 14 MG/24HR patch  Commonly known as: NICODERM CQ   1 patch, Transdermal, Every 24 Hours Scheduled   Start Date: November 21, 2023     spironolactone 25 MG tablet  Commonly known as: ALDACTONE   25 mg, Oral, Daily   Start Date: November 21, 2023     valsartan 320 MG tablet  Commonly known as: DIOVAN  Replaces: amLODIPine-valsartan  MG per tablet   320 mg, Oral, Every 24 Hours Scheduled   Start Date: November 21, 2023            Changes to Medications        Instructions Start Date   metoprolol succinate XL 50 MG 24 hr tablet  Commonly known as: TOPROL-XL  What changed:   medication strength  how much to take  when to take this  Another medication with the same name was removed. Continue taking this medication, and follow the directions you see here.   50 mg, Oral, Every 24 Hours Scheduled   Start Date: November 21, 2023            Continue These Medications        Instructions Start Date   busPIRone 5 MG tablet  Commonly known as: BUSPAR   10 mg, Oral, 2 Times Daily PRN      gabapentin 300 MG capsule  Commonly known as: NEURONTIN   300 mg, Oral, 2 Times Daily      potassium citrate 10 MEQ (1080 MG) CR tablet  Commonly known as: UROCIT-K   10 mEq, Oral, 3 Times Daily With Meals      tamsulosin 0.4 MG capsule 24 hr capsule  Commonly known as: FLOMAX   1 capsule, Oral, Daily      tiZANidine 2 MG tablet  Commonly  known as: ZANAFLEX   2 mg, Oral, Nightly PRN      traZODone 50 MG tablet  Commonly known as: DESYREL   50 mg, Oral, Nightly             Stop These Medications      amLODIPine-valsartan  MG per tablet  Commonly known as: EXFORGE  Replaced by: valsartan 320 MG tablet     cloNIDine 0.1 MG tablet  Commonly known as: CATAPRES     Testosterone Cypionate 200 MG/ML solution              Discharge Diet:   Diet Instructions       Advance Diet As Tolerated -Target Diet: Cardiac diet      Target Diet: Cardiac diet            Activity at Discharge:   Activity Instructions       Activity as Tolerated              Follow-up Appointments:   Follow-up with PCP 1 week.  Follow cardiology 1 month.    Electronically signed by Blayne Gary MD, 11/20/23, 14:50 CST.    Time: Greater than 30 minutes.

## 2023-11-20 NOTE — PLAN OF CARE
Goal Outcome Evaluation:  Plan of Care Reviewed With: patient        Progress: improving  Outcome Evaluation: Patient with no changes overnight. Plan for nephrology consult in am, possible dc home today. VSS, remains on room air. NSR 71-87 on tele.

## 2023-11-20 NOTE — PROGRESS NOTES
"    [facility] Medicine Services  INPATIENT PROGRESS NOTE    Patient Name: Deangelo Posadas  Date of Admission: 11/15/2023  Today's Date: 11/19/23  Length of Stay: 1  Primary Care Physician: Christiane Mancera APRN    Subjective     Patient went into AFL RVR at shift change yesterday.         Objective    Temp:  [97.9 °F (36.6 °C)-98.1 °F (36.7 °C)] 98 °F (36.7 °C)  Heart Rate:  [] 92  Resp:  [16-18] 18  BP: (125-147)/(64-88) 145/83    General: No acute distress  HEENT: normocephalic, atraumatic  Neck: Supple  CV: RRR  Lung: Clear to auscultation bilaterally.  No wheeze, rales, or rhonchi noted.  Abdominal exam: Positive bowel sounds, nontender, non distended  Extremity: No edema noted  Neurological exam: AAO x3. Cranial nerve II to XII grossly intact  Skin exam: Dry and warm  Psychiatric exam: Calm, cooperative, and normal affect       Results Review:  I have reviewed the labs, radiology results, and diagnostic studies.    Laboratory Data:   Results from last 7 days   Lab Units 11/19/23  0506 11/18/23  0505 11/17/23  0525   WBC 10*3/mm3 8.34 7.49 7.54   HEMOGLOBIN g/dL 16.0 15.7 14.6   HEMATOCRIT % 48.0 47.5 45.4   PLATELETS 10*3/mm3 312 298 285        Results from last 7 days   Lab Units 11/19/23  0506 11/18/23  1915 11/18/23  0505 11/17/23  0525   SODIUM mmol/L 136  --  138 140   POTASSIUM mmol/L 3.8 3.1* 2.8* 3.7   CHLORIDE mmol/L 100  --  99 106   CO2 mmol/L 24.0  --  27.0 26.0   BUN mg/dL 19  --  16 17   CREATININE mg/dL 1.15  --  0.95 0.92   CALCIUM mg/dL 9.3  --  9.1 9.1   BILIRUBIN mg/dL 0.5  --  0.7 0.6   ALK PHOS U/L 68  --  69 66   ALT (SGPT) U/L 25  --  20 18   AST (SGOT) U/L 23  --  21 18   GLUCOSE mg/dL 110*  --  108* 122*       Culture Data:   No results found for: \"BLOODCX\", \"URINECX\", \"WOUNDCX\", \"MRSACX\", \"RESPCX\", \"STOOLCX\"    Radiology Data:   Imaging Results (Last 24 Hours)       ** No results found for the last 24 hours. **            I have reviewed the " patient's current medications.     Assessment/Plan   Assessment  Active Hospital Problems    Diagnosis     **Hypertensive urgency     Atrial flutter with rapid ventricular response     Essential hypertension     Hypokalemia        Assessment and Plan:  58-year-old male with a past medical history of hypertension admitted for hypertensive emergency.    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 He was complaining of developing LE edema which resolved with lasix 20 IV x1 Pt reports episodic headaches and flushing with facial erythema that are associated with his elevated blood pressure. CT angiogram abdomen negative for renal artery stenosis. Plasma metanephrines, renin and aldosterone are all pending. He took metoprolol before the aldosterone test which may make the results spuriously low, he is also on ACE but the results can be interpreted as positive if the renin level is extremely low < 1  -cont home valsartan, amlodipine  -start aldactone 25 qd for resistant HTN in a pt that does not respond well to therapy with preferred agents like a long acting thiazide. He has chronic hypokalemia despite taking an ACE and HCTZ did exacerbate this. Aldactone would have the added effect of potassium sparing diuretic.  -stop HCTZ    Paroxysmal atrial flutter 2/2 acute on chronic hypokalemic episode and/or enlarged left atrium, currently in NSR: on 11/18 pt went into AFL RVR as high as 160s.  I gave diltiazem 25 IV x1 followed by diltiazem 35 IV x1, then started diltiazem drip and full dose lovenox. He spontaneously converted overnight. The unusual aspect of the episode was that he was completely asymptomatic. He may have had other undetected episodes prior to this admission.   -stop lovenox and start eliquis  -cardiology reordered home metoprolol and ordered 14 day holter monitor at discharge  -cont telemetry monitoring until  discharge    Chronic Hypokalemia: has been taking 30mEq daily for months yet presented with K 3.3 on admission.  -consult Nephrology in am  -replace K prn    Headaches likely 2/2 HTN, resolved: improved with better BP control  -MRI brain negative for acute pathology    BPH:  -cont home flomax    Anxiety:  -hold home buspar which can affect the results of secondary htn labs  -ativan prn      Dispo: We are monitoring him on telemetry to make sure he does not go back into a rapid ventricular rate or hypokalemia, Nephrology consult in am for chronic hypokalemia, the plan is for discharge with Holter monitor, eliquis and close cardiology follow-up for his episode of RVR and unexplained severe left atrial enlargement, close nephrology follow up for chronic hypokalemia that exacerbates his atrial excitability.      Filemon Miller MD 11/19/23, 18:46 CST.                  Treatment Plan      Medical Decision Making  Number and Complexity of problems:   Differential Diagnosis:     Conditions and Status             MDM Data  External documents reviewed:   Cardiac tracing (EKG, telemetry) interpretation:   Radiology interpretation:   Labs reviewed:   Any tests that were considered but not ordered:      Decision rules/scores evaluated (example [id number], Wells, etc):      Discussed with:      Care Planning  Shared decision making:   Code status and discussions:     Disposition  Social Determinants of Health that impact treatment or disposition:   I expect the patient to be discharged to  in  days.     Electronically signed by Filemon Miller MD, 11/19/23, 18:46 CST.

## 2023-11-20 NOTE — CONSULTS
Nephrology ([facility]) Consult Note      Patient:  Deangelo Posadas  YOB: 1965  Date of Service: 11/20/2023  MRN: [id number]   Acct: [id number]   Primary Care Physician: Christiane Mancera APRN  Advance Directive:   Code Status and Medical Interventions:   Ordered at: 11/15/23 2202     Level Of Support Discussed With:    Patient     Code Status (Patient has no pulse and is not breathing):    CPR (Attempt to Resuscitate)     Medical Interventions (Patient has pulse or is breathing):    Full Support     Admit Date: 11/15/2023       Hospital Day: 1  Referring Provider: No ref. provider found      Patient Seen, Chart, Consults, Notes, Labs, Radiology studies reviewed.    Chief complaint: Severe systolic and diastolic hypertension.    Subjective:  Deangelo Posadas is a 58 y.o. male  whom we were consulted for evaluation and treatment of severe hypertension and possible hyperaldosteronism.  He has known history of hypertension which has been fairly good control until recently.  Patient also has history of benign prostatic hypertrophy, depression, sleep apnea.  He presented to the emergency room with severe hypertension with systolic blood pressure 200/110 mmHg.  He was treated with intravenous labetalol and hydralazine.  He was placed on IV Cardene which has been weaned off slowly.  He denies any chest pain or shortness of breath.  CT scan of the head did not show any acute abnormality as he was complaining of severe headache.  Hospital course markable for adding couple of more blood pressure medications.  His labs were remarkable for persistently low potassium.  His renin/aldosterone level has been sent to the lab, pending results.  Meanwhile Aldactone was also started.  His potassium and blood pressure looks fine now.  Nephrology is consulted for evaluation of hyperaldosteronism versus renal artery stenosis.    Allergies:  Codeine    Home Meds:  Medications Prior to Admission    Medication Sig Dispense Refill Last Dose    amLODIPine-valsartan (EXFORGE)  MG per tablet Take 1 tablet by mouth Daily.   11/15/2023    busPIRone (BUSPAR) 5 MG tablet Take 2 tablets by mouth 2 (Two) Times a Day As Needed (anxiety).   11/15/2023    cloNIDine (CATAPRES) 0.1 MG tablet Take 1 tablet by mouth Daily As Needed for High Blood Pressure (BLOOD PRESSURE IS ABOVE 190/110.).   Past Month    gabapentin (NEURONTIN) 300 MG capsule Take 1 capsule by mouth 2 (Two) Times a Day.   11/15/2023    metoprolol succinate XL (TOPROL-XL) 100 MG 24 hr tablet Take 1 tablet by mouth Daily.   11/15/2023    metoprolol succinate XL (TOPROL-XL) 50 MG 24 hr tablet Take 1 tablet by mouth Every Night.   11/14/2023    potassium citrate (UROCIT-K) 10 MEQ (1080 MG) CR tablet Take 1 tablet by mouth 3 (Three) Times a Day With Meals.   11/15/2023    tamsulosin (FLOMAX) 0.4 MG capsule 24 hr capsule Take 1 capsule by mouth Daily.   11/15/2023    Testosterone Cypionate 200 MG/ML solution Inject 1 mL as directed 1 (One) Time Per Week.   11/13/2023    tiZANidine (ZANAFLEX) 2 MG tablet Take 1 tablet by mouth At Night As Needed for Muscle Spasms.   11/14/2023    traZODone (DESYREL) 50 MG tablet Take 1 tablet by mouth Every Night.   11/14/2023       Medicines:  Current Facility-Administered Medications   Medication Dose Route Frequency Provider Last Rate Last Admin    acetaminophen (TYLENOL) tablet 650 mg  650 mg Oral Q6H PRN Adalgisa Mai MD   650 mg at 11/20/23 1308    amLODIPine (NORVASC) tablet 10 mg  10 mg Oral Q24H Filemon Miller MD   10 mg at 11/20/23 0937    apixaban (ELIQUIS) tablet 5 mg  5 mg Oral Q12H Filemon Miller MD   5 mg at 11/20/23 0937    sennosides-docusate (PERICOLACE) 8.6-50 MG per tablet 2 tablet  2 tablet Oral BID Adalgisa Mai MD   2 tablet at 11/19/23 0827    And    polyethylene glycol (MIRALAX) packet 17 g  17 g Oral Daily PRN Adalgisa Mai MD        And    bisacodyl (DULCOLAX) EC tablet 5 mg   5 mg Oral Daily PRN Adalgisa Mai MD        And    bisacodyl (DULCOLAX) suppository 10 mg  10 mg Rectal Daily PRN Adalgisa Mai MD        Calcium Replacement - Follow Nurse / BPA Driven Protocol   Does not apply PRN Adalgisa Mai MD        gabapentin (NEURONTIN) capsule 300 mg  300 mg Oral TID Filemon Miller MD   300 mg at 11/20/23 0936    LORazepam (ATIVAN) tablet 0.5 mg  0.5 mg Oral Q6H PRN Filemon Miller MD   0.5 mg at 11/19/23 2227    Magnesium Standard Dose Replacement - Follow Nurse / BPA Driven Protocol   Does not apply PRN Adalgisa Mai MD        metoprolol succinate XL (TOPROL-XL) 24 hr tablet 50 mg  50 mg Oral Q24H Jason Collins APRN   50 mg at 11/20/23 0937    nicotine (NICODERM CQ) 14 MG/24HR patch 1 patch  1 patch Transdermal Q24H Filemon Miller MD   1 patch at 11/20/23 0939    NIFEdipine XL (PROCARDIA XL) 24 hr tablet 30 mg  30 mg Oral Daily PRN Filemon Miller MD        nitroglycerin (NITROSTAT) SL tablet 0.4 mg  0.4 mg Sublingual Q5 Min PRN Adalgisa Mai MD        ondansetron (ZOFRAN) injection 4 mg  4 mg Intravenous Q6H PRN Adalgisa Mai MD   4 mg at 11/16/23 0747    oxyCODONE (ROXICODONE) immediate release tablet 5 mg  5 mg Oral Q4H PRN Adalgisa Mai MD   5 mg at 11/18/23 0141    Phosphorus Replacement - Follow Nurse / BPA Driven Protocol   Does not apply PRN Aadlgisa Mai MD        Potassium Replacement - Follow Nurse / BPA Driven Protocol   Does not apply PRN Adalgisa Mai MD        promethazine (PHENERGAN) 12.5 mg in sodium chloride 0.9 % 50 mL  12.5 mg Intravenous Q6H PRN Filemon Miller MD   Stopped at 11/16/23 1346    sodium chloride 0.9 % flush 10 mL  10 mL Intravenous Q12H Adalgisa Mai MD   10 mL at 11/20/23 0937    sodium chloride 0.9 % flush 10 mL  10 mL Intravenous PRN Adalgisa Mai MD        sodium chloride 0.9 % infusion 40 mL  40 mL Intravenous PRN Adalgisa Mai MD        spironolactone  "(ALDACTONE) tablet 25 mg  25 mg Oral Daily Filemon Miller MD   25 mg at 11/20/23 0936    tamsulosin (FLOMAX) 24 hr capsule 0.4 mg  0.4 mg Oral Daily Adalgisa Mai MD   0.4 mg at 11/20/23 0937    tiZANidine (ZANAFLEX) tablet 4 mg  4 mg Oral Nightly PRN Adalgisa Mai MD        traZODone (DESYREL) tablet 50 mg  50 mg Oral Nightly Adalgisa Mai MD   50 mg at 11/19/23 2031    valsartan (DIOVAN) tablet 320 mg  320 mg Oral Q24H Filemon Miller MD   320 mg at 11/20/23 1134       Past Medical History:  Past Medical History:   Diagnosis Date    Hypertension     Prostate disease     Sleep apnea        Past Surgical History:  Past Surgical History:   Procedure Laterality Date    BACK SURGERY      CHOLECYSTECTOMY         Family History  History reviewed. No pertinent family history.    Social History  Social History     Socioeconomic History    Marital status:    Tobacco Use    Smoking status: Former     Types: Cigarettes    Smokeless tobacco: Never    Tobacco comments:     \"Short time around 6 years sago\"   Vaping Use    Vaping Use: Every day    Substances: Nicotine    Devices: Disposable   Substance and Sexual Activity    Alcohol use: Yes     Alcohol/week: 2.0 standard drinks of alcohol     Types: 2 Cans of beer per week    Drug use: Not Currently    Sexual activity: Yes     Partners: Female     Birth control/protection: None         Review of Systems:  History obtained from chart review and the patient  General ROS: No fever or chills  Respiratory ROS: No cough, shortness of breath, wheezing  Cardiovascular ROS: no chest pain or dyspnea on exertion  Gastrointestinal ROS: No abdominal pain or melena  Genito-Urinary ROS: No dysuria or hematuria  14 point ROS reviewed with the patient and negative except as noted above and in the HPI unless unable to obtain.    Objective:  /70 (BP Location: Right arm, Patient Position: Lying)   Pulse 72   Temp 98.1 °F (36.7 °C) (Oral)   Resp 16   Ht 182.9 " "cm (72.01\")   Wt 103 kg (226 lb 12.8 oz)   SpO2 94%   BMI 30.75 kg/m²     Intake/Output Summary (Last 24 hours) at 11/20/2023 1549  Last data filed at 11/20/2023 1300  Gross per 24 hour   Intake 840 ml   Output 750 ml   Net 90 ml     General: awake/alert   HEENT: Normocephalic atraumatic head  Neck: Supple with no JVD or carotid bruits.  Chest:  clear to auscultation bilaterally without respiratory distress  CVS: regular rate and rhythm  Abdominal: soft, nontender, normal bowel sounds  Extremities: no cyanosis or edema  Skin: warm and dry without rash      Labs:    Results from last 7 days   Lab Units 11/20/23  0509 11/19/23  0506 11/18/23  0505   WBC 10*3/mm3 7.47 8.34 7.49   HEMOGLOBIN g/dL 15.2 16.0 15.7   HEMATOCRIT % 46.3 48.0 47.5   PLATELETS 10*3/mm3 305 312 298       Results from last 7 days   Lab Units 11/20/23  0509 11/19/23  1841 11/19/23  0506 11/18/23  1915 11/18/23  0505   SODIUM mmol/L 137 136 136  --  138   POTASSIUM mmol/L 4.3 3.5 3.8   < > 2.8*   CHLORIDE mmol/L 104 101 100  --  99   CO2 mmol/L 24.0 24.0 24.0  --  27.0   BUN mg/dL 19 18 19  --  16   CREATININE mg/dL 1.17 1.20 1.15  --  0.95   CALCIUM mg/dL 9.6 9.8 9.3  --  9.1   BILIRUBIN mg/dL 0.4  --  0.5  --  0.7   ALK PHOS U/L 66  --  68  --  69   ALT (SGPT) U/L 45*  --  25  --  20   AST (SGOT) U/L 41*  --  23  --  21   GLUCOSE mg/dL 97 127* 110*  --  108*   EGFR mL/min/1.73 71.4 69.2 72.9  --  91.6    < > = values in this interval not displayed.         Radiology:   Imaging Results (Last 24 Hours)       ** No results found for the last 24 hours. **            Culture:  No components found for: \"WOUNDCUL\", \"3\"  No components found for: \"CSFCUL\", \"3\"  No components found for: \"BC\", \"3\"  No components found for: \"URINECUL\", \"3\"      Assessment   1.  Severe systolic and diastolic hypertension  2.  New onset of hypokalemia.  3.  Possible hyperaldosteronism  4.  History of benign essential hypertension.  5.  History of obstructive sleep " apnea.    Plan:  1.  Agree with care provided thus far.  2.  Get serum aldosterone/renin level/ratio  3.  If level high, proceed with CT scan of the abdomen followed by renal vein sampling  4.  Agree with Aldactone, increase the dose.  5.  Plan was discussed with the patient and his wife at the bedside.      Thank you for the consult, we appreciate the opportunity to provide care to your patients.  Feel free to contact me if I can be of any further assistance.      Ermias Farley MD  11/20/2023  15:49 CST

## 2023-11-20 NOTE — TELEPHONE ENCOUNTER
Caller: DISCHARGE RN    Relationship to patient: Provider    Best call back number:[phone]      Type of visit: HFU    Requested date: 6 WEEKS         Additional notes:PLEASE CALL PATIENT TO SCHEDULE 6 WEEK HOSPITAL FOLLOW UP  WITH .

## 2023-11-24 LAB — ALDOST SERPL-MCNC: 20.6 NG/DL (ref 0–30)

## 2023-11-27 LAB
ALDOST SERPL-MCNC: 9.3 NG/DL (ref 0–30)
ALDOST/RENIN PLAS-RTO: >55.7 {RATIO} (ref 0–30)
RENIN PLAS-CCNC: <0.167 NG/ML/HR (ref 0.17–5.38)

## 2023-11-28 LAB — RENIN PLAS-CCNC: 2.21 NG/ML/HR (ref 0.17–5.38)

## 2023-11-30 LAB
METANEPH FREE SERPL-MCNC: 40.1 PG/ML (ref 0–88)
NORMETANEPHRINE SERPL-MCNC: 214.4 PG/ML (ref 0–244)

## 2024-01-08 ENCOUNTER — OFFICE VISIT (OUTPATIENT)
Dept: CARDIOLOGY | Facility: CLINIC | Age: 59
End: 2024-01-08
Payer: COMMERCIAL

## 2024-01-08 VITALS
DIASTOLIC BLOOD PRESSURE: 82 MMHG | BODY MASS INDEX: 33.18 KG/M2 | SYSTOLIC BLOOD PRESSURE: 138 MMHG | RESPIRATION RATE: 18 BRPM | HEART RATE: 73 BPM | HEIGHT: 72 IN | OXYGEN SATURATION: 98 % | WEIGHT: 245 LBS

## 2024-01-08 DIAGNOSIS — I10 PRIMARY HYPERTENSION: Primary | ICD-10-CM

## 2024-01-08 DIAGNOSIS — I47.10 PAROXYSMAL SVT (SUPRAVENTRICULAR TACHYCARDIA): ICD-10-CM

## 2024-01-08 DIAGNOSIS — I48.92 ATRIAL FLUTTER, UNSPECIFIED TYPE: ICD-10-CM

## 2024-01-08 RX ORDER — HYDRALAZINE HYDROCHLORIDE 25 MG/1
25 TABLET, FILM COATED ORAL DAILY
COMMUNITY

## 2024-01-08 NOTE — PROGRESS NOTES
Subjective:     Encounter Date:01/08/2024      Patient ID: Deangelo Posadas is a 58 y.o. male     Chief Complaint:  History of Present Illness  Patient presents today for follow up after recent hospital admission. Patient was admitted in November for hypertensive urgency. While admitted he had a 1.5 hour of tachycardia which was felt to be atrial flutter. He was asymptomatic to the rhythm. He was discharged home in a monitor. He had brief episodes of SVT. He had an echo which was overall stable. He did have dilated left atrium. He had hypokalemia and this was replaced. He has hypertension. He smokes cigarettes but is working on quitting. He was started on several new blood pressure medications. Overall he is stable. He denies palpitations. He does note he has quit vaping and he has gained some weight. He notes his blood pressure has been better. Some occasional leg swelling.     The following portions of the patient's history were reviewed and updated as appropriate: allergies, current medications, past medical history, past social history, past and problem list.    Allergies   Allergen Reactions    Codeine Nausea And Vomiting       Current Outpatient Medications:     amLODIPine (NORVASC) 10 MG tablet, Take 1 tablet by mouth Daily., Disp: 90 tablet, Rfl: 0    apixaban (ELIQUIS) 5 MG tablet tablet, Take 1 tablet by mouth Every 12 (Twelve) Hours., Disp: 180 tablet, Rfl: 0    busPIRone (BUSPAR) 5 MG tablet, Take 2 tablets by mouth 2 (Two) Times a Day As Needed (anxiety)., Disp: , Rfl:     gabapentin (NEURONTIN) 300 MG capsule, Take 1 capsule by mouth 2 (Two) Times a Day., Disp: , Rfl:     hydrALAZINE (APRESOLINE) 25 MG tablet, Take 1 tablet by mouth Daily., Disp: , Rfl:     metoprolol succinate XL (TOPROL-XL) 50 MG 24 hr tablet, Take 1 tablet by mouth Daily., Disp: 90 tablet, Rfl: 0    potassium citrate (UROCIT-K) 10 MEQ (1080 MG) CR tablet, Take 1 tablet by mouth 3 (Three) Times a Day With Meals., Disp: , Rfl:      "spironolactone (ALDACTONE) 25 MG tablet, Take 1 tablet by mouth Daily., Disp: 90 tablet, Rfl: 0    tamsulosin (FLOMAX) 0.4 MG capsule 24 hr capsule, Take 1 capsule by mouth Daily., Disp: , Rfl:     tiZANidine (ZANAFLEX) 2 MG tablet, Take 1 tablet by mouth At Night As Needed for Muscle Spasms., Disp: , Rfl:     traZODone (DESYREL) 50 MG tablet, Take 1 tablet by mouth Every Night., Disp: , Rfl:     valsartan (DIOVAN) 320 MG tablet, Take 1 tablet by mouth Daily., Disp: 90 tablet, Rfl: 0    Social History     Socioeconomic History    Marital status:    Tobacco Use    Smoking status: Former     Types: Cigarettes    Smokeless tobacco: Never    Tobacco comments:     \"Short time around 6 years sago\"   Vaping Use    Vaping Use: Every day    Substances: Nicotine    Devices: Disposable   Substance and Sexual Activity    Alcohol use: Yes     Alcohol/week: 2.0 standard drinks of alcohol     Types: 2 Cans of beer per week    Drug use: Not Currently    Sexual activity: Yes     Partners: Female     Birth control/protection: None       Review of Systems   Constitutional: Positive for malaise/fatigue and weight gain. Negative for chills, decreased appetite, fever and weight loss.   HENT:  Negative for nosebleeds.    Eyes:  Negative for visual disturbance.   Cardiovascular:  Positive for leg swelling. Negative for chest pain, dyspnea on exertion, near-syncope, orthopnea, palpitations, paroxysmal nocturnal dyspnea and syncope.   Respiratory:  Positive for shortness of breath. Negative for cough, hemoptysis and snoring.    Endocrine: Negative for cold intolerance and heat intolerance.   Hematologic/Lymphatic: Negative for bleeding problem. Does not bruise/bleed easily.   Skin:  Negative for rash.   Musculoskeletal:  Negative for back pain and falls.   Gastrointestinal:  Negative for abdominal pain, constipation, diarrhea, heartburn, melena, nausea and vomiting.   Genitourinary:  Negative for hematuria.   Neurological:  Negative " "for dizziness, headaches and light-headedness.   Psychiatric/Behavioral:  Negative for altered mental status.    Allergic/Immunologic: Negative for persistent infections.              Objective:     Constitutional:       Appearance: Healthy appearance. Well-developed and not in distress.   Eyes:      Pupils: Pupils are equal, round, and reactive to light.   HENT:      Head: Normocephalic and atraumatic.   Neck:      Vascular: No carotid bruit or JVD.   Pulmonary:      Effort: Pulmonary effort is normal.      Breath sounds: Normal breath sounds.   Cardiovascular:      Normal rate. Regular rhythm.   Pulses:     Intact distal pulses.   Edema:     Peripheral edema absent.   Abdominal:      General: Bowel sounds are normal.      Palpations: Abdomen is soft.   Musculoskeletal: Normal range of motion.      Cervical back: Normal range of motion and neck supple. Skin:     General: Skin is warm and dry.   Neurological:      Mental Status: Alert and oriented to person, place, and time.      Deep Tendon Reflexes: Reflexes are normal and symmetric.   Psychiatric:         Behavior: Behavior normal.         Thought Content: Thought content normal.         Judgment: Judgment normal.             ECG 12 Lead    Date/Time: 1/8/2024 9:18 AM  Performed by: Jason Collins APRN    Authorized by: Jason Collins APRN  Comparison: compared with previous ECG from 11/19/2023  Similar to previous ECG  Rhythm: sinus rhythm        /82 (BP Location: Right arm, Patient Position: Sitting, Cuff Size: Adult)   Pulse 73   Resp 18   Ht 182.9 cm (72\")   Wt 111 kg (245 lb)   SpO2 98%   BMI 33.23 kg/m²   Lab Review:   I have reviewed       Lab Results   Component Value Date    TRIG 118 01/04/2019    HDL 48 01/04/2019     (H) 01/04/2019      Results for orders placed during the hospital encounter of 11/15/23    Adult Transthoracic Echo Complete W/ Cont if Necessary Per Protocol    Interpretation Summary    Left ventricular systolic " function is normal. Left ventricular ejection fraction appears to be 56 - 60%.    Left ventricular wall thickness is consistent with mild concentric hypertrophy.    Normal right ventricular cavity size and systolic function noted.    No significant (greater than mild) valvular abnormalities identified on this study.     Assessment:          Diagnosis Plan   1. Primary hypertension        2. Atrial flutter, unspecified type        3. Paroxysmal SVT (supraventricular tachycardia)               Plan:       Hypertension - blood pressure stable. Follow with PCP. Low salt diet.  Atrial Flutter - maintaining NSR. New diagnosis during recent admission. None on monitor. [id number] score is 1. Okay to switch to Aspirin 81 mg daily. Lengthy discussion had about Aspirin VS. NOAC. He will complete NOAC he has and then switch to Aspirin. He has Cardia Watch to monitor heart rate.   Paroxysmal SVT - noted on monitor. Asymptomatic. On Toprol.            Follow up in 6 months. Call if need to be seen sooner

## 2024-07-10 ENCOUNTER — OFFICE VISIT (OUTPATIENT)
Dept: CARDIOLOGY | Facility: CLINIC | Age: 59
End: 2024-07-10
Payer: COMMERCIAL

## 2024-07-10 VITALS
OXYGEN SATURATION: 98 % | SYSTOLIC BLOOD PRESSURE: 125 MMHG | RESPIRATION RATE: 18 BRPM | WEIGHT: 206 LBS | HEART RATE: 80 BPM | HEIGHT: 72 IN | DIASTOLIC BLOOD PRESSURE: 75 MMHG | BODY MASS INDEX: 27.9 KG/M2

## 2024-07-10 DIAGNOSIS — I10 ESSENTIAL HYPERTENSION: Primary | ICD-10-CM

## 2024-07-10 DIAGNOSIS — I48.92 ATRIAL FLUTTER WITH RAPID VENTRICULAR RESPONSE: ICD-10-CM

## 2024-07-10 DIAGNOSIS — I47.10 PAROXYSMAL SVT (SUPRAVENTRICULAR TACHYCARDIA): ICD-10-CM

## 2024-07-10 PROCEDURE — 99214 OFFICE O/P EST MOD 30 MIN: CPT | Performed by: NURSE PRACTITIONER

## 2024-07-10 RX ORDER — ASPIRIN 81 MG/1
81 TABLET, CHEWABLE ORAL DAILY
COMMUNITY

## 2024-07-10 NOTE — PROGRESS NOTES
Subjective:     Encounter Date:07/10/2024      Patient ID: Deangelo Posadas is a 58 y.o. male     Chief Complaint:  Hypertension  Associated symptoms include malaise/fatigue and shortness of breath. Pertinent negatives include no chest pain, headaches, orthopnea, palpitations or PND.     Patient presents today for routine cardiology follow up. Patient was admitted in November for hypertensive urgency. While admitted he was documented to have 1.5 hours of atrial arrhythmia felt to be atrial flutter. He was asymptomatic. He was started on anticoagulated and discharged home in a  monitor.  He was discharged home in a monitor. The monitor showed short runs of SVT. He was noted to have dilated left atrium. He did have hypokalemia while admitted. He was started on several new blood pressure medications. He has means to check rhythm and heart rate at home and nichelle any issues. Denies palpitations. His anticoagulation was switched to Aspirin- which he is tolerating. BP stable. Follows with Christiane WHITE as his PCP.     The following portions of the patient's history were reviewed and updated as appropriate: allergies, current medications, past medical history, past social history, past and problem list.    Allergies   Allergen Reactions    Codeine Nausea And Vomiting       Current Outpatient Medications:     amLODIPine (NORVASC) 10 MG tablet, Take 1 tablet by mouth Daily., Disp: 90 tablet, Rfl: 0    aspirin 81 MG chewable tablet, Chew 1 tablet Daily., Disp: , Rfl:     busPIRone (BUSPAR) 5 MG tablet, Take 2 tablets by mouth 2 (Two) Times a Day As Needed (anxiety)., Disp: , Rfl:     gabapentin (NEURONTIN) 300 MG capsule, Take 1 capsule by mouth As Needed., Disp: , Rfl:     hydrALAZINE (APRESOLINE) 25 MG tablet, Take 1 tablet by mouth Daily., Disp: , Rfl:     metoprolol succinate XL (TOPROL-XL) 50 MG 24 hr tablet, Take 1 tablet by mouth Daily., Disp: 90 tablet, Rfl: 0    potassium citrate (UROCIT-K) 10 MEQ (1080 MG) CR  "tablet, Take 1 tablet by mouth 3 (Three) Times a Day With Meals., Disp: , Rfl:     spironolactone (ALDACTONE) 25 MG tablet, Take 1 tablet by mouth Daily., Disp: 90 tablet, Rfl: 0    tamsulosin (FLOMAX) 0.4 MG capsule 24 hr capsule, Take 1 capsule by mouth Daily., Disp: , Rfl:     tiZANidine (ZANAFLEX) 2 MG tablet, Take 1 tablet by mouth At Night As Needed for Muscle Spasms., Disp: , Rfl:     traZODone (DESYREL) 50 MG tablet, Take 1 tablet by mouth Every Night., Disp: , Rfl:     valsartan (DIOVAN) 320 MG tablet, Take 1 tablet by mouth Daily., Disp: 90 tablet, Rfl: 0    Social History     Socioeconomic History    Marital status:    Tobacco Use    Smoking status: Former     Types: Cigarettes    Smokeless tobacco: Never    Tobacco comments:     \"Short time around 6 years sago\"   Vaping Use    Vaping status: Every Day    Substances: Nicotine    Devices: Disposable   Substance and Sexual Activity    Alcohol use: Yes     Alcohol/week: 2.0 standard drinks of alcohol     Types: 2 Cans of beer per week    Drug use: Not Currently    Sexual activity: Yes     Partners: Female     Birth control/protection: None       Review of Systems   Constitutional: Positive for malaise/fatigue. Negative for chills, decreased appetite, fever and weight loss.   HENT:  Negative for nosebleeds.    Eyes:  Negative for visual disturbance.   Cardiovascular:  Negative for chest pain, dyspnea on exertion, leg swelling, near-syncope, orthopnea, palpitations, paroxysmal nocturnal dyspnea and syncope.   Respiratory:  Positive for shortness of breath. Negative for cough, hemoptysis and snoring.    Endocrine: Negative for cold intolerance and heat intolerance.   Hematologic/Lymphatic: Negative for bleeding problem. Does not bruise/bleed easily.   Skin:  Negative for rash.   Musculoskeletal:  Negative for back pain and falls.   Gastrointestinal:  Negative for abdominal pain, constipation, diarrhea, heartburn, melena, nausea and vomiting. " "  Genitourinary:  Negative for hematuria.   Neurological:  Negative for dizziness, headaches and light-headedness.   Psychiatric/Behavioral:  Negative for altered mental status.    Allergic/Immunologic: Negative for persistent infections.              Objective:     Constitutional:       Appearance: Healthy appearance. Well-developed and not in distress.   Eyes:      Pupils: Pupils are equal, round, and reactive to light.   HENT:      Head: Normocephalic and atraumatic.   Neck:      Vascular: No carotid bruit or JVD.   Pulmonary:      Effort: Pulmonary effort is normal.      Breath sounds: Normal breath sounds.   Cardiovascular:      Normal rate. Regular rhythm.      Murmurs: There is no murmur.   Pulses:     Intact distal pulses.   Edema:     Peripheral edema absent.   Abdominal:      General: Bowel sounds are normal.      Palpations: Abdomen is soft.   Musculoskeletal: Normal range of motion.      Cervical back: Normal range of motion and neck supple. Skin:     General: Skin is warm and dry.   Neurological:      Mental Status: Alert and oriented to person, place, and time.      Deep Tendon Reflexes: Reflexes are normal and symmetric.   Psychiatric:         Behavior: Behavior normal.         Thought Content: Thought content normal.         Judgment: Judgment normal.           Procedures  /75 (BP Location: Right arm, Patient Position: Sitting, Cuff Size: Adult)   Pulse 80   Resp 18   Ht 182.9 cm (72\")   Wt 93.4 kg (206 lb)   SpO2 98%   BMI 27.94 kg/m²   Lab Review:   I have reviewed       Lab Results   Component Value Date    TRIG 118 01/04/2019    HDL 48 01/04/2019     (H) 01/04/2019      Results for orders placed during the hospital encounter of 11/15/23    Adult Transthoracic Echo Complete W/ Cont if Necessary Per Protocol    Interpretation Summary    Left ventricular systolic function is normal. Left ventricular ejection fraction appears to be 56 - 60%.    Left ventricular wall thickness is " consistent with mild concentric hypertrophy.    Normal right ventricular cavity size and systolic function noted.    No significant (greater than mild) valvular abnormalities identified on this study.     Assessment:          Diagnosis Plan   1. Essential hypertension        2. Atrial flutter with rapid ventricular response        3. Paroxysmal SVT (supraventricular tachycardia)                 Plan:       Hypertension - blood pressure stable. He asked about need for medications- and I advised him to continue as BP is controlled and he is tolerating. Follow up with PCP.   Atrial Flutter - 1.5 hours while admitted for hypertensive urgency and electrolyte abnormalities. No recurrence. [id number] score is 1. Anticoagulation stopped. On Aspirin.   Paroxysmal SVT- brief on monitor. On Toprol.     Follow up in 1 year or sooner if needed.

## 2025-08-11 ENCOUNTER — OFFICE VISIT (OUTPATIENT)
Dept: CARDIOLOGY | Facility: CLINIC | Age: 60
End: 2025-08-11
Payer: COMMERCIAL

## 2025-08-11 VITALS
HEART RATE: 82 BPM | WEIGHT: 218 LBS | BODY MASS INDEX: 29.53 KG/M2 | RESPIRATION RATE: 18 BRPM | SYSTOLIC BLOOD PRESSURE: 128 MMHG | OXYGEN SATURATION: 97 % | DIASTOLIC BLOOD PRESSURE: 80 MMHG | HEIGHT: 72 IN

## 2025-08-11 DIAGNOSIS — I10 ESSENTIAL HYPERTENSION: ICD-10-CM

## 2025-08-11 DIAGNOSIS — I48.92 ATRIAL FLUTTER WITH RAPID VENTRICULAR RESPONSE: Primary | ICD-10-CM

## 2025-08-11 DIAGNOSIS — I47.10 PAROXYSMAL SVT (SUPRAVENTRICULAR TACHYCARDIA): ICD-10-CM

## 2025-08-11 PROCEDURE — 99214 OFFICE O/P EST MOD 30 MIN: CPT | Performed by: NURSE PRACTITIONER

## 2025-08-11 PROCEDURE — 93000 ELECTROCARDIOGRAM COMPLETE: CPT | Performed by: NURSE PRACTITIONER
